# Patient Record
Sex: FEMALE | Race: WHITE | Employment: OTHER | URBAN - METROPOLITAN AREA
[De-identification: names, ages, dates, MRNs, and addresses within clinical notes are randomized per-mention and may not be internally consistent; named-entity substitution may affect disease eponyms.]

---

## 2022-04-19 PROBLEM — C49.A0 GASTROINTESTINAL STROMAL TUMOR (GIST) (HCC): Status: ACTIVE | Noted: 2022-04-19

## 2022-04-19 PROBLEM — D50.9 IRON DEFICIENCY ANEMIA: Status: ACTIVE | Noted: 2022-04-19

## 2022-04-19 PROBLEM — C78.7 SECONDARY MALIGNANT NEOPLASM OF LIVER AND INTRAHEPATIC BILE DUCT (HCC): Status: ACTIVE | Noted: 2022-04-19

## 2022-05-03 ENCOUNTER — HOSPITAL ENCOUNTER (OUTPATIENT)
Dept: LAB | Age: 72
Discharge: HOME | End: 2022-05-03

## 2022-05-03 DIAGNOSIS — C49.A0 GASTROINTESTINAL STROMAL TUMOR (GIST) (HCC): ICD-10-CM

## 2022-05-03 LAB
ALBUMIN SERPL-MCNC: 3.5 G/DL (ref 3.2–4.6)
ALBUMIN/GLOB SERPL: 1 {RATIO} (ref 1.2–3.5)
ALP SERPL-CCNC: 69 U/L (ref 50–136)
ALT SERPL-CCNC: 29 U/L (ref 12–65)
ANION GAP SERPL CALC-SCNC: 4 MMOL/L (ref 7–16)
AST SERPL-CCNC: 27 U/L (ref 15–37)
BASOPHILS # BLD: 0 K/UL (ref 0–0.2)
BASOPHILS NFR BLD: 1 % (ref 0–2)
BILIRUB SERPL-MCNC: 0.5 MG/DL (ref 0.2–1.1)
BUN SERPL-MCNC: 16 MG/DL (ref 8–23)
CALCIUM SERPL-MCNC: 8.9 MG/DL (ref 8.3–10.4)
CHLORIDE SERPL-SCNC: 107 MMOL/L (ref 98–107)
CO2 SERPL-SCNC: 30 MMOL/L (ref 21–32)
CREAT SERPL-MCNC: 0.6 MG/DL (ref 0.6–1)
DIFFERENTIAL METHOD BLD: ABNORMAL
EOSINOPHIL # BLD: 0.1 K/UL (ref 0–0.8)
EOSINOPHIL NFR BLD: 2 % (ref 0.5–7.8)
ERYTHROCYTE [DISTWIDTH] IN BLOOD BY AUTOMATED COUNT: 19.9 % (ref 11.9–14.6)
GLOBULIN SER CALC-MCNC: 3.5 G/DL (ref 2.3–3.5)
GLUCOSE SERPL-MCNC: 77 MG/DL (ref 65–100)
HCT VFR BLD AUTO: 41.7 % (ref 35.8–46.3)
HGB BLD-MCNC: 12.8 G/DL (ref 11.7–15.4)
IMM GRANULOCYTES # BLD AUTO: 0 K/UL (ref 0–0.5)
IMM GRANULOCYTES NFR BLD AUTO: 0 % (ref 0–5)
LYMPHOCYTES # BLD: 2.3 K/UL (ref 0.5–4.6)
LYMPHOCYTES NFR BLD: 31 % (ref 13–44)
MCH RBC QN AUTO: 25 PG (ref 26.1–32.9)
MCHC RBC AUTO-ENTMCNC: 30.7 G/DL (ref 31.4–35)
MCV RBC AUTO: 81.3 FL (ref 79.6–97.8)
MONOCYTES # BLD: 0.5 K/UL (ref 0.1–1.3)
MONOCYTES NFR BLD: 7 % (ref 4–12)
NEUTS SEG # BLD: 4.5 K/UL (ref 1.7–8.2)
NEUTS SEG NFR BLD: 59 % (ref 43–78)
NRBC # BLD: 0 K/UL (ref 0–0.2)
PLATELET # BLD AUTO: 237 K/UL (ref 150–450)
PMV BLD AUTO: 8.2 FL (ref 9.4–12.3)
POTASSIUM SERPL-SCNC: 3.6 MMOL/L (ref 3.5–5.1)
PROT SERPL-MCNC: 7 G/DL (ref 6.3–8.2)
RBC # BLD AUTO: 5.13 M/UL (ref 4.05–5.2)
SODIUM SERPL-SCNC: 141 MMOL/L (ref 136–145)
WBC # BLD AUTO: 7.5 K/UL (ref 4.3–11.1)

## 2022-05-03 PROCEDURE — 36415 COLL VENOUS BLD VENIPUNCTURE: CPT

## 2022-05-03 PROCEDURE — 85025 COMPLETE CBC W/AUTO DIFF WBC: CPT

## 2022-05-03 PROCEDURE — 80053 COMPREHEN METABOLIC PANEL: CPT

## 2022-05-23 ENCOUNTER — TELEPHONE (OUTPATIENT)
Dept: ONCOLOGY | Age: 72
End: 2022-05-23

## 2022-05-23 RX ORDER — REGORAFENIB 40 MG/1
120 TABLET, FILM COATED ORAL DAILY
Qty: 63 TABLET | Refills: 0 | Status: SHIPPED | OUTPATIENT
Start: 2022-05-23 | End: 2022-05-23

## 2022-05-23 RX ORDER — REGORAFENIB 40 MG/1
120 TABLET, FILM COATED ORAL DAILY
Qty: 63 TABLET | Refills: 2 | Status: SHIPPED | OUTPATIENT
Start: 2022-05-23 | End: 2022-05-23

## 2022-05-23 RX ORDER — REGORAFENIB 40 MG/1
120 TABLET, FILM COATED ORAL DAILY
Qty: 63 TABLET | Refills: 2 | Status: SHIPPED | OUTPATIENT
Start: 2022-05-23 | End: 2022-06-13

## 2022-05-23 NOTE — TELEPHONE ENCOUNTER
Sister called on behalf of pt requesting to speak to the nurse or Andrew Muñiz. Stating that [facility] has not relesed the steirvarga medication for the pt due to the directions not matching. The pt has been off the medication for a few days now and is wanting to speak to someone to have this fixed as soon as possible.

## 2022-05-23 NOTE — TELEPHONE ENCOUNTER
Stivarga prescription sent to [facility] (120 mg x 21 days with 7 days off). Returned call to pt to let her know [facility] will be calling her to set up shipment. Pt verbalized understanding and clarified that she indeed is supposed to take 3 tabs daily for a total of 120 mg x 21 days and not 1 pill (40 mg).

## 2022-05-23 NOTE — PROGRESS NOTES
Received message from [facility] wanting clarification stating: [facility] Team,  Vickie [phone]  ROSEMARY MAN hold, information to verify: Please review the dose of Stivarga 40mg, patients previous prescription was for 3 tablets (120mg) daily for 21 days on and 7 days off. The newest rx received from provider Mallory Cardenas is a significant dose decrease to one tablet daily. Please review. Thank you,  Katia Rice ([facility]). \"    I sent question to nurse for clarification.

## 2022-05-24 ENCOUNTER — TELEPHONE (OUTPATIENT)
Dept: ONCOLOGY | Age: 72
End: 2022-05-24

## 2022-05-26 NOTE — PROGRESS NOTES
Per Donovan Zepedama needed for [employer]. PA initiated and per CMM:       Enrollment application for Stivarga with [employer] submitted successfully today.

## 2022-05-26 NOTE — TELEPHONE ENCOUNTER
Received call from Pedro Luis Emerson in regards to patient's Stivarga. Representative stated Caden Gutierrez needs us to complete PA and finish the enrollment form via CMM. I expressed understanding and will follow up thru CMM to finish submitting PA and complete the enrollment as well.

## 2022-06-01 ENCOUNTER — TELEPHONE (OUTPATIENT)
Dept: ONCOLOGY | Age: 72
End: 2022-06-01

## 2022-06-01 NOTE — TELEPHONE ENCOUNTER
Sister called on behalf of pt requesting to r/s her appts on 6/6. Stated would like the appt scheduled 2 weeks out around June 20th-22th.

## 2022-06-02 ENCOUNTER — TELEPHONE (OUTPATIENT)
Dept: ONCOLOGY | Age: 72
End: 2022-06-02

## 2022-06-02 NOTE — TELEPHONE ENCOUNTER
Called pt to remind of appt on 06/06. Pt had to reschedule CT scan and will need to reschedule this follow up appt. Please contact pt with new appt info. CT scan is Fri 06/17.

## 2022-06-03 DIAGNOSIS — C49.A0 GASTROINTESTINAL STROMAL TUMOR (GIST) (HCC): Primary | ICD-10-CM

## 2022-06-03 NOTE — PROGRESS NOTES
Received fax from [facility] about [address] stating this drug is available thru patient's plan without a PA. Letter scanned into media.

## 2022-06-06 ENCOUNTER — HOSPITAL ENCOUNTER (OUTPATIENT)
Dept: LAB | Age: 72
Discharge: HOME | End: 2022-06-09
Payer: COMMERCIAL

## 2022-06-06 ENCOUNTER — OFFICE VISIT (OUTPATIENT)
Dept: ONCOLOGY | Age: 72
End: 2022-06-06
Payer: COMMERCIAL

## 2022-06-06 VITALS
WEIGHT: 119 LBS | BODY MASS INDEX: 21.9 KG/M2 | DIASTOLIC BLOOD PRESSURE: 91 MMHG | HEIGHT: 62 IN | RESPIRATION RATE: 17 BRPM | TEMPERATURE: 97.9 F | SYSTOLIC BLOOD PRESSURE: 189 MMHG | HEART RATE: 76 BPM | OXYGEN SATURATION: 99 %

## 2022-06-06 DIAGNOSIS — C49.A0 GASTROINTESTINAL STROMAL TUMOR (GIST) (HCC): ICD-10-CM

## 2022-06-06 DIAGNOSIS — Z79.899 HIGH RISK MEDICATION USE: ICD-10-CM

## 2022-06-06 DIAGNOSIS — L27.1 HAND FOOT SYNDROME: ICD-10-CM

## 2022-06-06 DIAGNOSIS — C49.A0 GASTROINTESTINAL STROMAL TUMOR (GIST) (HCC): Primary | ICD-10-CM

## 2022-06-06 DIAGNOSIS — C78.7 LIVER METASTASIS (HCC): ICD-10-CM

## 2022-06-06 DIAGNOSIS — I10 UNCONTROLLED HYPERTENSION: ICD-10-CM

## 2022-06-06 LAB
ALBUMIN SERPL-MCNC: 3.4 G/DL (ref 3.2–4.6)
ALBUMIN/GLOB SERPL: 0.9 {RATIO} (ref 1.2–3.5)
ALP SERPL-CCNC: 63 U/L (ref 50–136)
ALT SERPL-CCNC: 24 U/L (ref 12–65)
ANION GAP SERPL CALC-SCNC: 4 MMOL/L (ref 7–16)
AST SERPL-CCNC: 26 U/L (ref 15–37)
BASOPHILS # BLD: 0.1 K/UL (ref 0–0.2)
BASOPHILS NFR BLD: 1 % (ref 0–2)
BILIRUB SERPL-MCNC: 0.5 MG/DL (ref 0.2–1.1)
BUN SERPL-MCNC: 15 MG/DL (ref 8–23)
CALCIUM SERPL-MCNC: 8.6 MG/DL (ref 8.3–10.4)
CHLORIDE SERPL-SCNC: 105 MMOL/L (ref 98–107)
CO2 SERPL-SCNC: 29 MMOL/L (ref 21–32)
CREAT SERPL-MCNC: 0.6 MG/DL (ref 0.6–1)
DIFFERENTIAL METHOD BLD: ABNORMAL
EOSINOPHIL # BLD: 0.2 K/UL (ref 0–0.8)
EOSINOPHIL NFR BLD: 2 % (ref 0.5–7.8)
ERYTHROCYTE [DISTWIDTH] IN BLOOD BY AUTOMATED COUNT: 20.9 % (ref 11.9–14.6)
GLOBULIN SER CALC-MCNC: 3.8 G/DL (ref 2.3–3.5)
GLUCOSE SERPL-MCNC: 96 MG/DL (ref 65–100)
HCT VFR BLD AUTO: 40.5 % (ref 35.8–46.3)
HGB BLD-MCNC: 12.6 G/DL (ref 11.7–15.4)
IMM GRANULOCYTES # BLD AUTO: 0 K/UL (ref 0–0.5)
IMM GRANULOCYTES NFR BLD AUTO: 0 % (ref 0–5)
LYMPHOCYTES # BLD: 2.3 K/UL (ref 0.5–4.6)
LYMPHOCYTES NFR BLD: 27 % (ref 13–44)
MCH RBC QN AUTO: 25.1 PG (ref 26.1–32.9)
MCHC RBC AUTO-ENTMCNC: 31.1 G/DL (ref 31.4–35)
MCV RBC AUTO: 80.7 FL (ref 79.6–97.8)
MONOCYTES # BLD: 0.6 K/UL (ref 0.1–1.3)
MONOCYTES NFR BLD: 7 % (ref 4–12)
NEUTS SEG # BLD: 5.3 K/UL (ref 1.7–8.2)
NEUTS SEG NFR BLD: 63 % (ref 43–78)
NRBC # BLD: 0 K/UL (ref 0–0.2)
PLATELET # BLD AUTO: 294 K/UL (ref 150–450)
PMV BLD AUTO: 8.2 FL (ref 9.4–12.3)
POTASSIUM SERPL-SCNC: 3.7 MMOL/L (ref 3.5–5.1)
PROT SERPL-MCNC: 7.2 G/DL (ref 6.3–8.2)
RBC # BLD AUTO: 5.02 M/UL (ref 4.05–5.2)
SODIUM SERPL-SCNC: 138 MMOL/L (ref 136–145)
WBC # BLD AUTO: 8.5 K/UL (ref 4.3–11.1)

## 2022-06-06 PROCEDURE — 80053 COMPREHEN METABOLIC PANEL: CPT

## 2022-06-06 PROCEDURE — 36415 COLL VENOUS BLD VENIPUNCTURE: CPT

## 2022-06-06 PROCEDURE — 85025 COMPLETE CBC W/AUTO DIFF WBC: CPT

## 2022-06-06 PROCEDURE — 99215 OFFICE O/P EST HI 40 MIN: CPT | Performed by: INTERNAL MEDICINE

## 2022-06-06 PROCEDURE — 1123F ACP DISCUSS/DSCN MKR DOCD: CPT | Performed by: INTERNAL MEDICINE

## 2022-06-06 RX ORDER — METOPROLOL SUCCINATE 25 MG/1
25 TABLET, EXTENDED RELEASE ORAL DAILY
COMMUNITY
Start: 2022-04-19 | End: 2022-07-06 | Stop reason: SDUPTHER

## 2022-06-06 ASSESSMENT — PATIENT HEALTH QUESTIONNAIRE - PHQ9
1. LITTLE INTEREST OR PLEASURE IN DOING THINGS: 1
2. FEELING DOWN, DEPRESSED OR HOPELESS: 1
SUM OF ALL RESPONSES TO PHQ QUESTIONS 1-9: 2
SUM OF ALL RESPONSES TO PHQ9 QUESTIONS 1 & 2: 2
SUM OF ALL RESPONSES TO PHQ QUESTIONS 1-9: 2

## 2022-06-06 NOTE — PROGRESS NOTES
[facility]: Office Visit Progress Note    Chief Complaint:     GIST      History of Present Illness:   Reason for Referral: Gastrointestinal stromal  tumor (GIST); Secondary malignant neoplasm of liver and intrahepatic bile duct; Iron deficiency anemia due to chronic blood loss       Referring Provider: Daniel Lopes NP       Primary Care Provider: Pa Rivera NP       Family History of Cancer/Hematologic Disorders: Family history is significant for lung cancer, throat/neck cancer, and acute leukemia.        Presenting Symptoms: Fatigue, constipation, nausea, and abdominal distension       Ms. Ross is a 80 y.o. white female with a history of anemia with history of blood transfusion, HTN, fibroid removal, and myomectomy. Patient  was admitted at [address] in March of 2020 for severe anemia and GI bleed. Work-up included CT scan of the abdomen and pelvis on 3/16/2020 which identified a large soft tissue mass in the posterior margin of the greater curvature measuring  17.9 cm with hypodensity in the left hepatic lobe reflective of metastatic lesion and soft tissue lesion adjacent to the right posterior hepatic lobe suggestive of metastatic implant. On 3/18/20 she underwent upper GI endoscopy with biopsy of the gastric  body mass with pathology revealing a gastrointestinal stromal tumor. Immunohistochemistry was strongly positive for  confirming GIST. Flow cytometric analysis showed mild granulocytic left shift and mild monocytosis; and elevated T cell CD4/CD8 ratio,  with no definitive immunophenotypic evidence of lymphoproliferative disease. During hospitalization, CEA was 0.8 and HGB dropped to 6.0. She received several units of PRBCs. She also received IV iron infusions, as oral iron had been ineffective.  She was  planned for a colonoscopy; however, she declined the procedure.        Upon discharge, she was seen outpatient by Oncologist, Dr. Mike Philip, at [facility]. She started treatment for GIST with Imatinib on 5/24/2020.  CT of the abdomen and pelvis on 9/21/20 when compared to CT of 3/16/20 showed  a significant initial response to treatment with the LUQ tumor measuring 15.5 x 12.6 x 9.9 cm in March of 2020 and measuring 10.3 x 9.9. 3.5 cm in September of 2020. CT of the abdomen and pelvis on 2/17/21 and 8/27/21 showed a stable appearance of gastric  neoplasm with no progression of disease. Patient continued to tolerate Imatinib well.        On 11/8/21, CT A/P imaging showed signs of disease progression. In view of progression, treatment was changed to Sunitinib with does reduction to 37.5 mg daily, 4 weeks on, 2 weeks off. ECHO on 12/9/21 showed an EF of 60% and patient was subsequently  started on Sutent. Unfortunately, she experienced severe body aches, peeling skin and felt miserable, and Sutent was discontinued.  Patient was given information about Stivarga, and she initially declined to start this. However, on 2/21/22, CT of the  abdomen and pelvis showed multiple large partially cystic masses throughout the abdomen and pelvis which had significantly progressed compared to imaging on 11/8/21. Patient was started on Stivarga 40 mg on 3/10/22.         Labs drawn on 2/23/22 showed mild worsening of anemia. Patient reported intermittent bloody stools, but she declined GI referral. Iron panel showed iron deficiency and IV Infed x 4 doses was ordered. Patient received the first dose on 3/30/22. No documentation  was sent to indicate that she received the remaining 3 doses.        Most recent labs drawn on 3/31/22 were significant for HGB 11.4, HCT 34.5, RDW 18.6, , and monocytes 10.1. Patient was most recently seen by Dr. Cyn Monahan on 3/30/22.  Patient has since relocated from [place] to the [facility], and now presents  to [facility], per referral from primary care, to establish oncology care for continued management of gastrointestinal stromal tumor, secondary malignant neoplasm of liver and intrahepatic  bile duct, and iron deficiency anemia due to chronic blood loss. She continues on Stivarga 40 mg.        SURGICAL PATHOLOGY 3/18/2020                 FLOW CYTOMETRY ANALYSIS 3/18/2020            CT ABDOMEN AND PELVIS W CONTRAST 11/8/21                               CT CHEST WITH CONTRAST 11/8/21                 CT OF THE ABDOMEN AND PELVIS WITHOUT IV CONTRAST 2/21/22                      CMP 1/6/22            CBC 3/9/22                 CBC 3/31/22                    Review of Systems:      Constitutional  Denies fever or chills.  Denies weight loss or appetite changes. Denies anorexia. Meilie Duty  Denies trauma, hearing loss, ear pain, nosebleeds, sore throat, neck pain and ear discharge.    Change in vision         Skin  Rash, itching, blister     Lungs  Denies shortness of breath, cough, sputum production or hemoptysis. Cardiovascular  Denies palpitations, orthopnea, claudication and leg swelling. Chest pain/pressure     Gastrointestinal  Denies nausea, vomiting. Denies abdominal pain. Constipation, dark black/bloody stools       Denies dysuria, or hesitancy of urination   Frequency      Neuro  Denies ataxia. Denies dizziness, tremors, speech change, focal weakness. Neuropathy, headaches     Hematology  Denies nasal/gum bleeding, denies easy bruise     Endo  Denies heat/cold intolerance, denies diabetes. MSK  Denies back pain, swollen legs, and falls. Joint pain     Psychiatric/Behavioral  Denies depression and substance abuse.  The patient is not nervous/anxious.              No Known Allergies  Past Medical History:   Diagnosis Date    Anemia     GIST (gastrointestinal stromal tumor), malignant ([address]     Hypertension     Stomach cancer ([address]     GIST     Past Surgical History:   Procedure Laterality Date    MYOMECTOMY      OTHER SURGICAL HISTORY      removal of fibroid    UPPER GASTROINTESTINAL ENDOSCOPY      UPPER GASTROINTESTINAL ENDOSCOPY       Family History   Problem Relation Age of Onset    Lung Cancer Mother     Diabetes Mother     No Known Problems Brother      Social History     Socioeconomic History    Marital status: Single     Spouse name: Not on file    Number of children: Not on file    Years of education: Not on file    Highest education level: Not on file   Occupational History    Not on file   Tobacco Use    Smoking status: Never Smoker    Smokeless tobacco: Never Used   Substance and Sexual Activity    Alcohol use: Not Currently    Drug use: Not Currently    Sexual activity: Not on file   Other Topics Concern    Not on file   Social History Narrative    Not on file     Social Determinants of Health     Financial Resource Strain:     Difficulty of Paying Living Expenses: Not on file   Food Insecurity:     Worried About Running Out of Food in the Last Year: Not on file    Min of Food in the Last Year: Not on file   Transportation Needs:     Lack of Transportation (Medical): Not on file    Lack of Transportation (Non-Medical):  Not on file   Physical Activity:     Days of Exercise per Week: Not on file    Minutes of Exercise per Session: Not on file   Stress:     Feeling of Stress : Not on file   Social Connections:     Frequency of Communication with Friends and Family: Not on file    Frequency of Social Gatherings with Friends and Family: Not on file    Attends [employer] Services: Not on file    Active Member of Clubs or Organizations: Not on file    Attends Club or Organization Meetings: Not on file    Marital Status: Not on file   Intimate Partner Violence:     Fear of Current or Ex-Partner: Not on file    Emotionally Abused: Not on file    Physically Abused: Not on file    Sexually Abused: Not on file   Housing Stability:     Unable to Pay for Housing in the Last Year: Not on file    Number of [place] in the Last Year: Not on file    Unstable Housing in the Last Year: Not on file     Current Outpatient Medications   Medication Sig Dispense Refill    metoprolol succinate (TOPROL XL) 25 MG extended release tablet Take 25 mg by mouth daily      urea (CARMOL) 10 % cream Apply topically 2 times daily Apply topically as needed. 453 g 0    regorafenib (STIVARGA) 40 MG tablet Take 3 tablets by mouth daily for 21 days Take 3 tabs (120 mg) by mouth daily x 21 days and 7 days off 63 tablet 2     No current facility-administered medications for this visit. OBJECTIVE:  BP (!) 189/91 (Site: Right Upper Arm, Position: Standing, Cuff Size: Medium Adult)   Pulse 76   Temp 97.9 °F (36.6 °C) (Oral)   Resp 17   Ht 5' 2\" (1.575 m)   Wt 119 lb (54 kg)   SpO2 99%   Breastfeeding No   BMI 21.77 kg/m²     Physical Exam:  Constitutional: Oriented to person, place, and time. Well-developed and well-nourished. HEENT: Normocephalic and atraumatic. Oropharynx is clear and moist.   Conjunctivae and EOM are normal. Pupils are equal, round, and reactive to light. No scleral icterus. Neck supple. No JVD present. No tracheal deviation present. No thyromegaly present. Lymph node No palpable submandibular, cervical, supraclavicular, axillary and inguinal lymph nodes. Skin  calluses and blisters on bottom of both feet. Warm and dry. No bruising and no rash noted. No erythema. No pallor. Respiratory Effort normal and breath sounds normal.  No respiratory distress. No wheezes. No rales. No tenderness. CVS Normal rate, regular rhythm and normal heart sounds. Exam reveals no gallop, no friction and no rub. No murmur heard. Abdomen Soft. Bowel sounds are normal. Exhibits no distension. There is no tenderness. There is no rebound and no guarding. Neuro Normal reflexes. No cranial nerve deficit. Exhibits normal muscle tone, 5 of 5 strength of all extremities. MSK Normal range of motion in general.  No edema and no tenderness. Psych Normal mood, affect, behavior, judgment and thought content      Labs:  Recent Results (from the past 24 hour(s))   CBC with Auto Differential    Collection Time: 06/06/22  2:49 PM   Result Value Ref Range    WBC 8.5 4.3 - 11.1 K/uL    RBC 5.02 4.05 - 5.2 M/uL    Hemoglobin 12.6 11.7 - 15.4 g/dL    Hematocrit 40.5 35.8 - 46.3 %    MCV 80.7 79.6 - 97.8 FL    MCH 25.1 (L) 26.1 - 32.9 PG    MCHC 31.1 (L) 31.4 - 35.0 g/dL    RDW 20.9 (H) 11.9 - 14.6 %    Platelets 879 388 - 439 K/uL    MPV 8.2 (L) 9.4 - 12.3 FL    nRBC 0.00 0.0 - 0.2 K/uL    Seg Neutrophils 63 43 - 78 %    Lymphocytes 27 13 - 44 %    Monocytes 7 4.0 - 12.0 %    Eosinophils % 2 0.5 - 7.8 %    Basophils 1 0.0 - 2.0 %    Immature Granulocytes 0 0.0 - 5.0 %    Segs Absolute 5.3 1.7 - 8.2 K/UL    Absolute Lymph # 2.3 0.5 - 4.6 K/UL    Absolute Mono # 0.6 0.1 - 1.3 K/UL    Absolute Eos # 0.2 0.0 - 0.8 K/UL    Basophils Absolute 0.1 0.0 - 0.2 K/UL    Absolute Immature Granulocyte 0.0 0.0 - 0.5 K/UL    Differential Type AUTOMATED     Comprehensive Metabolic Panel    Collection Time: 06/06/22  2:49 PM   Result Value Ref Range    Sodium 138 136 - 145 mmol/L    Potassium 3.7 3.5 - 5.1 mmol/L    Chloride 105 98 - 107 mmol/L    CO2 29 21 - 32 mmol/L    Anion Gap 4 (L) 7 - 16 mmol/L    Glucose 96 65 - 100 mg/dL    BUN 15 8 - 23 MG/DL    CREATININE 0.60 0.6 - 1.0 MG/DL    GFR African American >60 >60 ml/min/1.73m2    GFR Non- >60 >60 ml/min/1.73m2    Calcium 8.6 8.3 - 10.4 MG/DL    Total Bilirubin 0.5 0.2 - 1.1 MG/DL    ALT 24 12 - 65 U/L    AST 26 15 - 37 U/L    Alk Phosphatase 63 50 - 136 U/L    Total Protein 7.2 6.3 - 8.2 g/dL    Albumin 3.4 3.2 - 4.6 g/dL    Globulin 3.8 (H) 2.3 - 3.5 g/dL    Albumin/Globulin Ratio 0.9 (L) 1.2 - 3.5         Imaging:  No results found for this or any previous visit. ASSESSMENT/PLAN:   Diagnosis Orders   1. Gastrointestinal stromal tumor (GIST) ([facility]     2. Liver metastasis ([facility]     3.  Hand foot syndrome 4. Uncontrolled hypertension     5. High risk medication use       80 y.o. F consulted for metastatic GIST presented to [facility] with sister on 5/3/2022. She was diagnosed of large stomach GIST  and the liver metastasis in 3/2020, started [address] in 5/2020 with good response until 11/2021, did not try to increase dose of imatinib and changed to sunitinib which was poorly tolerated and stopped quickly, follow-up imaging showed disease progression  and started regorafenib from 3/2022, relocated to [place] to join sister to help with her care given she struggles to manage her medication and visits due to the Alzheimer's. We discussed her current dose of Regorafenib was dose reduced but will  continue current 120 mg dose and repeat CT in a month, will obtain image from [place] to compare, may consider rechallenge with higher dose of imatinib 800 mg daily given the good tolerance and efficacy previously, may repeat biopsy for mutation screening,  she also had complaining of headache but declined brain MRI. Return next month but call as needed. She rescheduled that the CT and did not return before starting cycle 4, called to working on 6/6/2022 complaining of pain and blister on bottom of feet, which did not seem typical spread of HFS but there is calluses with blisters and pain, which was consistent to her poor foot care when she forgot to bring her shoes from [place] to her brother sent them over, prescribed urea lotion and discussed saturate with lotion and wear socks and comfortable shoes, would not change dose for Stivarga for now, concern of increased abdominal distention and pain but had not color response for imaging after faxing and calling/leaving messages to her previous oncologist, repeat CT and return in 2 weeks, call as needed. All questions are answered to their satisfaction. They will call for further questions and concerns.       ECOG PERFORMANCE STATUS - 1- Restricted in physically strenuous activity but ambulatory and able to carry out work of a light or sedentary nature such as light house work, office work. Pain - 0 - No pain/10. Mild to moderate pain, requiring medication - see MAR     Fatigue - No flowsheet data found. Distress - No flowsheet data found. Elements of this note have been dictated via voice recognition software. Text and phrases may be limited by the accuracy and autoconversion of the software. The chart has been reviewed, but errors may still be present. Jarad Freitas M.D.   [address]  Office : [phone]  Fax : [phone]

## 2022-06-06 NOTE — PATIENT INSTRUCTIONS
Patient Instructions from Today's Visit    Reason for Visit:  Follow-up visit - GIST    Diagnosis Information:  [email or web address]-[email or web address]-[email or web address]-[email or web address]-[email or web address]-sheets      Plan:  -CT scheduled in a couple weeks. Continue taking the Stivarga. -You should start checking your blood pressure at home in the morning and at night while at rest. The goal for the top number of your BP is 110-140. Keep a log and bring it to us or to your primary care doctor so he/she can adjust your medications accordingly.      -Hand-foot syndrome  · Caused by some chemotherapy (IV and pill)  · Palms of hands and bottoms of feet become red, tender and possible peeling/blistering of skin. Looks like a sunburn. · Heat and friction to hands and feet can increase symptoms  · Prevention measures  · reduce friction and heat exposure to hands and feet  · avoid exposure to hot water (washing dishes, long showers, baths, etc.)  · avoid jogging, aerobics, jumping, and long days of walking  · cold may provide relief from pain - ice packs or frozen bag of veggies to hands and feet. Alternate on and off for about 15 minutes at a time. · keep hands and feet very well moisturized, several times a day. Good lotions      Aveeno, lubriderm, udder cream, bag balm, etc.  Lotion with urea in it. · You need to call when any symptoms start - redness, tenderness, or peeling.                    Follow Up:  -After CT scan     Recent Lab Results:  Hospital Outpatient Visit on 06/06/2022   Component Date Value Ref Range Status    WBC 06/06/2022 8.5  4.3 - 11.1 K/uL Final    RBC 06/06/2022 5.02  4.05 - 5.2 M/uL Final    Hemoglobin 06/06/2022 12.6  11.7 - 15.4 g/dL Final    Hematocrit 06/06/2022 40.5  35.8 - 46.3 % Final    MCV 06/06/2022 80.7  79.6 - 97.8 FL Final    MCH 06/06/2022 25.1* 26.1 - 32.9 PG Final    MCHC 06/06/2022 31.1* 31.4 - 35.0 g/dL Final    RDW 06/06/2022 20.9* 11.9 - 14.6 % Final    Platelets 19/53/4901 294  150 - 450 K/uL Final    MPV 06/06/2022 8.2* 9.4 - 12.3 FL Final    nRBC 06/06/2022 0.00  0.0 - 0.2 K/uL Final    **Note: Absolute NRBC parameter is now reported with Hemogram**    Seg Neutrophils 06/06/2022 63  43 - 78 % Final    Lymphocytes 06/06/2022 27  13 - 44 % Final    Monocytes 06/06/2022 7  4.0 - 12.0 % Final    Eosinophils % 06/06/2022 2  0.5 - 7.8 % Final    Basophils 06/06/2022 1  0.0 - 2.0 % Final    Immature Granulocytes 06/06/2022 0  0.0 - 5.0 % Final    Segs Absolute 06/06/2022 5.3  1.7 - 8.2 K/UL Final    Absolute Lymph # 06/06/2022 2.3  0.5 - 4.6 K/UL Final    Absolute Mono # 06/06/2022 0.6  0.1 - 1.3 K/UL Final    Absolute Eos # 06/06/2022 0.2  0.0 - 0.8 K/UL Final    Basophils Absolute 06/06/2022 0.1  0.0 - 0.2 K/UL Final    Absolute Immature Granulocyte 06/06/2022 0.0  0.0 - 0.5 K/UL Final    Differential Type 06/06/2022 AUTOMATED    Final    Sodium 06/06/2022 138  136 - 145 mmol/L Final    Potassium 06/06/2022 3.7  3.5 - 5.1 mmol/L Final    Chloride 06/06/2022 105  98 - 107 mmol/L Final    CO2 06/06/2022 29  21 - 32 mmol/L Final    Anion Gap 06/06/2022 4* 7 - 16 mmol/L Final    Glucose 06/06/2022 96  65 - 100 mg/dL Final    BUN 06/06/2022 15  8 - 23 MG/DL Final    CREATININE 06/06/2022 0.60  0.6 - 1.0 MG/DL Final    GFR  06/06/2022 >60  >60 ml/min/1.73m2 Final    GFR Non- 06/06/2022 >60  >60 ml/min/1.73m2 Final    Comment:      Estimated GFR is calculated using the Modification of Diet in Renal Disease (MDRD) Study equation, reported for both  Americans (GFRAA) and non- Americans (GFRNA), and normalized to 1.73m2 body surface area. The physician must decide which value applies to the patient. The MDRD study equation should only be used in individuals age 25 or older.  It has not been validated for the following: pregnant women, patients with serious comorbid conditions,or on certain medications, or persons with extremes of body size, muscle mass, or nutritional status.  Calcium 06/06/2022 8.6  8.3 - 10.4 MG/DL Final    Total Bilirubin 06/06/2022 0.5  0.2 - 1.1 MG/DL Final    ALT 06/06/2022 24  12 - 65 U/L Final    AST 06/06/2022 26  15 - 37 U/L Final    Alk Phosphatase 06/06/2022 63  50 - 136 U/L Final    Total Protein 06/06/2022 7.2  6.3 - 8.2 g/dL Final    Albumin 06/06/2022 3.4  3.2 - 4.6 g/dL Final    Globulin 06/06/2022 3.8* 2.3 - 3.5 g/dL Final    Albumin/Globulin Ratio 06/06/2022 0.9* 1.2 - 3.5   Final       Treatment Summary has been discussed and given to patient: N/A      -------------------------------------------------------------------------------------------------------------------  Please call our office at [phone] if you have any  of the following symptoms:   · Fever of 100.5 or greater  · Chills  · Shortness of breath  · Swelling or pain in one leg    After office hours an answering service is available and will contact a provider for emergencies or if you are experiencing any of the above symptoms.  Patient did not express an interest in My Chart. My Chart log in information explained on the after visit summary printout at the [facility] desk.     Michael Zaragoza RN

## 2022-06-13 ENCOUNTER — TELEPHONE (OUTPATIENT)
Dept: ONCOLOGY | Age: 72
End: 2022-06-13

## 2022-06-13 NOTE — TELEPHONE ENCOUNTER
Pt called requesting to speak to the nurse. .. Stated she had an appt on Monday and was under the impression the Dr told her to stop her chemo (Stivarga 40mg) due to her having some blisters on her foot and the impression of her having hand and food disease. Would like to know if she can start taking them again.

## 2022-06-13 NOTE — TELEPHONE ENCOUNTER
Call to [address] [place] [address] had stopped the stivarga from the 6 th. I spoke to her and she states her feet are better and she will start the [address] [place] [address] Street today.  She Verbalizes understanding of instructions

## 2022-06-15 DIAGNOSIS — C49.A0 GASTROINTESTINAL STROMAL TUMOR (GIST) (HCC): Primary | ICD-10-CM

## 2022-06-17 ENCOUNTER — HOSPITAL ENCOUNTER (OUTPATIENT)
Dept: CT IMAGING | Age: 72
Discharge: HOME | End: 2022-06-20

## 2022-06-17 DIAGNOSIS — C78.7 SECONDARY MALIGNANT NEOPLASM OF LIVER (HCC): ICD-10-CM

## 2022-06-17 DIAGNOSIS — C49.A0 GASTROINTESTINAL STROMAL TUMOR (HCC): ICD-10-CM

## 2022-06-20 ENCOUNTER — OFFICE VISIT (OUTPATIENT)
Dept: ONCOLOGY | Age: 72
End: 2022-06-20
Payer: MEDICARE

## 2022-06-20 ENCOUNTER — HOSPITAL ENCOUNTER (OUTPATIENT)
Dept: LAB | Age: 72
Discharge: HOME | End: 2022-06-23
Payer: MEDICARE

## 2022-06-20 VITALS
TEMPERATURE: 97.8 F | RESPIRATION RATE: 16 BRPM | HEART RATE: 75 BPM | DIASTOLIC BLOOD PRESSURE: 91 MMHG | SYSTOLIC BLOOD PRESSURE: 185 MMHG | BODY MASS INDEX: 21.23 KG/M2 | WEIGHT: 115.4 LBS | HEIGHT: 62 IN | OXYGEN SATURATION: 99 %

## 2022-06-20 DIAGNOSIS — C49.A0 GASTROINTESTINAL STROMAL TUMOR (GIST) (HCC): ICD-10-CM

## 2022-06-20 DIAGNOSIS — Z79.899 HIGH RISK MEDICATION USE: ICD-10-CM

## 2022-06-20 DIAGNOSIS — C78.7 LIVER METASTASIS (HCC): ICD-10-CM

## 2022-06-20 DIAGNOSIS — I10 UNCONTROLLED HYPERTENSION: ICD-10-CM

## 2022-06-20 DIAGNOSIS — R63.4 WEIGHT LOSS: ICD-10-CM

## 2022-06-20 DIAGNOSIS — C49.A0 GASTROINTESTINAL STROMAL TUMOR (GIST) (HCC): Primary | ICD-10-CM

## 2022-06-20 DIAGNOSIS — L27.1 HAND FOOT SYNDROME: ICD-10-CM

## 2022-06-20 LAB
ALBUMIN SERPL-MCNC: 3.3 G/DL (ref 3.2–4.6)
ALBUMIN/GLOB SERPL: 0.8 {RATIO} (ref 1.2–3.5)
ALP SERPL-CCNC: 63 U/L (ref 50–136)
ALT SERPL-CCNC: 17 U/L (ref 12–65)
ANION GAP SERPL CALC-SCNC: 5 MMOL/L (ref 7–16)
AST SERPL-CCNC: 21 U/L (ref 15–37)
BASOPHILS # BLD: 0 K/UL (ref 0–0.2)
BASOPHILS NFR BLD: 1 % (ref 0–2)
BILIRUB SERPL-MCNC: 0.6 MG/DL (ref 0.2–1.1)
BUN SERPL-MCNC: 17 MG/DL (ref 8–23)
CALCIUM SERPL-MCNC: 8.7 MG/DL (ref 8.3–10.4)
CHLORIDE SERPL-SCNC: 105 MMOL/L (ref 98–107)
CO2 SERPL-SCNC: 29 MMOL/L (ref 21–32)
CREAT SERPL-MCNC: 0.7 MG/DL (ref 0.6–1)
DIFFERENTIAL METHOD BLD: ABNORMAL
EOSINOPHIL # BLD: 0.2 K/UL (ref 0–0.8)
EOSINOPHIL NFR BLD: 2 % (ref 0.5–7.8)
ERYTHROCYTE [DISTWIDTH] IN BLOOD BY AUTOMATED COUNT: 21.2 % (ref 11.9–14.6)
GLOBULIN SER CALC-MCNC: 3.9 G/DL (ref 2.3–3.5)
GLUCOSE SERPL-MCNC: 122 MG/DL (ref 65–100)
HCT VFR BLD AUTO: 42.2 % (ref 35.8–46.3)
HGB BLD-MCNC: 13 G/DL (ref 11.7–15.4)
IMM GRANULOCYTES # BLD AUTO: 0 K/UL (ref 0–0.5)
IMM GRANULOCYTES NFR BLD AUTO: 0 % (ref 0–5)
LYMPHOCYTES # BLD: 1.7 K/UL (ref 0.5–4.6)
LYMPHOCYTES NFR BLD: 23 % (ref 13–44)
MAGNESIUM SERPL-MCNC: 2.1 MG/DL (ref 1.8–2.4)
MCH RBC QN AUTO: 25 PG (ref 26.1–32.9)
MCHC RBC AUTO-ENTMCNC: 30.8 G/DL (ref 31.4–35)
MCV RBC AUTO: 81.2 FL (ref 79.6–97.8)
MONOCYTES # BLD: 0.5 K/UL (ref 0.1–1.3)
MONOCYTES NFR BLD: 7 % (ref 4–12)
NEUTS SEG # BLD: 4.8 K/UL (ref 1.7–8.2)
NEUTS SEG NFR BLD: 67 % (ref 43–78)
NRBC # BLD: 0 K/UL (ref 0–0.2)
PLATELET # BLD AUTO: 353 K/UL (ref 150–450)
PMV BLD AUTO: 8.5 FL (ref 9.4–12.3)
POTASSIUM SERPL-SCNC: 3.8 MMOL/L (ref 3.5–5.1)
PROT SERPL-MCNC: 7.2 G/DL (ref 6.3–8.2)
RBC # BLD AUTO: 5.2 M/UL (ref 4.05–5.2)
SODIUM SERPL-SCNC: 139 MMOL/L (ref 136–145)
WBC # BLD AUTO: 7.1 K/UL (ref 4.3–11.1)

## 2022-06-20 PROCEDURE — 1090F PRES/ABSN URINE INCON ASSESS: CPT | Performed by: INTERNAL MEDICINE

## 2022-06-20 PROCEDURE — G8427 DOCREV CUR MEDS BY ELIG CLIN: HCPCS | Performed by: INTERNAL MEDICINE

## 2022-06-20 PROCEDURE — 3017F COLORECTAL CA SCREEN DOC REV: CPT | Performed by: INTERNAL MEDICINE

## 2022-06-20 PROCEDURE — 85025 COMPLETE CBC W/AUTO DIFF WBC: CPT

## 2022-06-20 PROCEDURE — 1123F ACP DISCUSS/DSCN MKR DOCD: CPT | Performed by: INTERNAL MEDICINE

## 2022-06-20 PROCEDURE — 83735 ASSAY OF MAGNESIUM: CPT

## 2022-06-20 PROCEDURE — 36415 COLL VENOUS BLD VENIPUNCTURE: CPT

## 2022-06-20 PROCEDURE — 1036F TOBACCO NON-USER: CPT | Performed by: INTERNAL MEDICINE

## 2022-06-20 PROCEDURE — G8420 CALC BMI NORM PARAMETERS: HCPCS | Performed by: INTERNAL MEDICINE

## 2022-06-20 PROCEDURE — G8400 PT W/DXA NO RESULTS DOC: HCPCS | Performed by: INTERNAL MEDICINE

## 2022-06-20 PROCEDURE — 99215 OFFICE O/P EST HI 40 MIN: CPT | Performed by: INTERNAL MEDICINE

## 2022-06-20 PROCEDURE — 80053 COMPREHEN METABOLIC PANEL: CPT

## 2022-06-20 RX ORDER — LOSARTAN POTASSIUM 25 MG/1
25 TABLET ORAL DAILY
Qty: 90 TABLET | Refills: 0 | Status: SHIPPED | OUTPATIENT
Start: 2022-06-20 | End: 2022-06-21 | Stop reason: DRUGHIGH

## 2022-06-20 RX ORDER — REGORAFENIB 40 MG/1
120 TABLET, FILM COATED ORAL DAILY
COMMUNITY
Start: 2022-06-14 | End: 2022-07-20 | Stop reason: SDUPTHER

## 2022-06-20 ASSESSMENT — PATIENT HEALTH QUESTIONNAIRE - PHQ9
SUM OF ALL RESPONSES TO PHQ QUESTIONS 1-9: 0
SUM OF ALL RESPONSES TO PHQ QUESTIONS 1-9: 0
2. FEELING DOWN, DEPRESSED OR HOPELESS: 0
SUM OF ALL RESPONSES TO PHQ QUESTIONS 1-9: 0
SUM OF ALL RESPONSES TO PHQ9 QUESTIONS 1 & 2: 0
1. LITTLE INTEREST OR PLEASURE IN DOING THINGS: 0
SUM OF ALL RESPONSES TO PHQ QUESTIONS 1-9: 0

## 2022-06-20 NOTE — PATIENT INSTRUCTIONS
Patient Instructions from Today's Visit    Reason for Visit:  Follow-up visit for GIST     Diagnosis Information:  [email or web address]    Plan:  -Continue taking the Stivarga.   -We will prescribe losartan to add to your blood pressure medication metoprolol.   -The CT scan did not show significant change. Continue with current treatment.   -You can take a vitamin B1 supplement daily (available over-the-counter).      Follow Up:  -1 month    Recent Lab Results:  Hospital Outpatient Visit on 06/20/2022   Component Date Value Ref Range Status    WBC 06/20/2022 7.1  4.3 - 11.1 K/uL Final    RBC 06/20/2022 5.20  4.05 - 5.2 M/uL Final    Hemoglobin 06/20/2022 13.0  11.7 - 15.4 g/dL Final    Hematocrit 06/20/2022 42.2  35.8 - 46.3 % Final    MCV 06/20/2022 81.2  79.6 - 97.8 FL Final    MCH 06/20/2022 25.0* 26.1 - 32.9 PG Final    MCHC 06/20/2022 30.8* 31.4 - 35.0 g/dL Final    RDW 06/20/2022 21.2* 11.9 - 14.6 % Final    Platelets 48/58/4986 353  150 - 450 K/uL Final    MPV 06/20/2022 8.5* 9.4 - 12.3 FL Final    nRBC 06/20/2022 0.00  0.0 - 0.2 K/uL Final    **Note: Absolute NRBC parameter is now reported with Hemogram**    Seg Neutrophils 06/20/2022 67  43 - 78 % Final    Lymphocytes 06/20/2022 23  13 - 44 % Final    Monocytes 06/20/2022 7  4.0 - 12.0 % Final    Eosinophils % 06/20/2022 2  0.5 - 7.8 % Final    Basophils 06/20/2022 1  0.0 - 2.0 % Final    Immature Granulocytes 06/20/2022 0  0.0 - 5.0 % Final    Segs Absolute 06/20/2022 4.8  1.7 - 8.2 K/UL Final    Absolute Lymph # 06/20/2022 1.7  0.5 - 4.6 K/UL Final    Absolute Mono # 06/20/2022 0.5  0.1 - 1.3 K/UL Final    Absolute Eos # 06/20/2022 0.2  0.0 - 0.8 K/UL Final    Basophils Absolute 06/20/2022 0.0  0.0 - 0.2 K/UL Final    Absolute Immature Granulocyte 06/20/2022 0.0  0.0 - 0.5 K/UL Final    Differential Type 06/20/2022 AUTOMATED    Final    Sodium 06/20/2022 139  136 - 145 mmol/L Final    Potassium 06/20/2022 3.8  3.5 - 5.1 mmol/L Final    Chloride 06/20/2022 105  98 - 107 mmol/L Final    CO2 06/20/2022 29  21 - 32 mmol/L Final    Anion Gap 06/20/2022 5* 7 - 16 mmol/L Final    Glucose 06/20/2022 122* 65 - 100 mg/dL Final    BUN 06/20/2022 17  8 - 23 MG/DL Final    CREATININE 06/20/2022 0.70  0.6 - 1.0 MG/DL Final    GFR  06/20/2022 >60  >60 ml/min/1.73m2 Final    GFR Non- 06/20/2022 >60  >60 ml/min/1.73m2 Final    Comment:      Estimated GFR is calculated using the Modification of Diet in Renal Disease (MDRD) Study equation, reported for both  Americans (GFRAA) and non- Americans (GFRNA), and normalized to 1.73m2 body surface area. The physician must decide which value applies to the patient. The MDRD study equation should only be used in individuals age 25 or older. It has not been validated for the following: pregnant women, patients with serious comorbid conditions,or on certain medications, or persons with extremes of body size, muscle mass, or nutritional status.       Calcium 06/20/2022 8.7  8.3 - 10.4 MG/DL Final    Total Bilirubin 06/20/2022 0.6  0.2 - 1.1 MG/DL Final    ALT 06/20/2022 17  12 - 65 U/L Final    AST 06/20/2022 21  15 - 37 U/L Final    Alk Phosphatase 06/20/2022 63  50 - 136 U/L Final    Total Protein 06/20/2022 7.2  6.3 - 8.2 g/dL Final    Albumin 06/20/2022 3.3  3.2 - 4.6 g/dL Final    Globulin 06/20/2022 3.9* 2.3 - 3.5 g/dL Final    Albumin/Globulin Ratio 06/20/2022 0.8* 1.2 - 3.5   Final    Magnesium 06/20/2022 2.1  1.8 - 2.4 mg/dL Final       Treatment Summary has been discussed and given to patient: N/A      -------------------------------------------------------------------------------------------------------------------  Please call our office at [phone] if you have any  of the following symptoms:   · Fever of 100.5 or greater  · Chills  · Shortness of breath  · Swelling or pain in one leg    After office hours an answering service is available and will contact a provider for emergencies or if you are experiencing any of the above symptoms.  Patient does express an interest in My Chart. My Chart log in information explained on the after visit summary printout at the [facility] [address] desk.     Isaac Chambers RN

## 2022-06-20 NOTE — PROGRESS NOTES
[facility] Hematology & Oncology: Office Visit Progress Note    Chief Complaint:     GIST      History of Present Illness:   Reason for Referral: Gastrointestinal stromal  tumor (GIST); Secondary malignant neoplasm of liver and intrahepatic bile duct; Iron deficiency anemia due to chronic blood loss       Referring Provider: Minesh Lopes NP       Primary Care Provider: Shekhar Mims NP       Family History of Cancer/Hematologic Disorders: Family history is significant for lung cancer, throat/neck cancer, and acute leukemia.        Presenting Symptoms: Fatigue, constipation, nausea, and abdominal distension       Ms. Ross is a 80 y.o. white female with a history of anemia with history of blood transfusion, HTN, fibroid removal, and myomectomy. Patient  was admitted at [address] [place] [address] in [place] in March of 2020 for severe anemia and GI bleed. Work-up included CT scan of the abdomen and pelvis on 3/16/2020 which identified a large soft tissue mass in the posterior margin of the greater curvature measuring  17.9 cm with hypodensity in the left hepatic lobe reflective of metastatic lesion and soft tissue lesion adjacent to the right posterior hepatic lobe suggestive of metastatic implant. On 3/18/20 she underwent upper GI endoscopy with biopsy of the gastric  body mass with pathology revealing a gastrointestinal stromal tumor. Immunohistochemistry was strongly positive for  confirming GIST. Flow cytometric analysis showed mild granulocytic left shift and mild monocytosis; and elevated T cell CD4/CD8 ratio,  with no definitive immunophenotypic evidence of lymphoproliferative disease. During hospitalization, CEA was 0.8 and HGB dropped to 6.0. She received several units of PRBCs. She also received IV iron infusions, as oral iron had been ineffective.  She was  planned for a colonoscopy; however, she declined the procedure.        Upon discharge, she was seen outpatient by Oncologist, Dr. Dru Gibbs, at [facility]. She started treatment for GIST with Imatinib on 5/24/2020.  CT of the abdomen and pelvis on 9/21/20 when compared to CT of 3/16/20 showed  a significant initial response to treatment with the LUQ tumor measuring 15.5 x 12.6 x 9.9 cm in March of 2020 and measuring 10.3 x 9.9. 3.5 cm in September of 2020. CT of the abdomen and pelvis on 2/17/21 and 8/27/21 showed a stable appearance of gastric  neoplasm with no progression of disease. Patient continued to tolerate Imatinib well.        On 11/8/21, CT A/P imaging showed signs of disease progression. In view of progression, treatment was changed to Sunitinib with does reduction to 37.5 mg daily, 4 weeks on, 2 weeks off. ECHO on 12/9/21 showed an EF of 60% and patient was subsequently  started on Sutent. Unfortunately, she experienced severe body aches, peeling skin and felt miserable, and Sutent was discontinued.  Patient was given information about Stivarga, and she initially declined to start this. However, on 2/21/22, CT of the  abdomen and pelvis showed multiple large partially cystic masses throughout the abdomen and pelvis which had significantly progressed compared to imaging on 11/8/21. Patient was started on Stivarga 40 mg on 3/10/22.         Labs drawn on 2/23/22 showed mild worsening of anemia. Patient reported intermittent bloody stools, but she declined GI referral. Iron panel showed iron deficiency and IV Infed x 4 doses was ordered. Patient received the first dose on 3/30/22. No documentation  was sent to indicate that she received the remaining 3 doses.        Most recent labs drawn on 3/31/22 were significant for HGB 11.4, HCT 34.5, RDW 18.6, , and monocytes 10.1. Patient was most recently seen by Dr. Maria De Jesus Lott on 3/30/22.  Patient has since relocated from [place] to the [facility], and now presents  to [facility], per referral from primary care, to establish oncology care for continued management of gastrointestinal stromal tumor, secondary malignant neoplasm of liver and intrahepatic  bile duct, and iron deficiency anemia due to chronic blood loss. She continues on Stivarga 40 mg.        SURGICAL PATHOLOGY 3/18/2020                 FLOW CYTOMETRY ANALYSIS 3/18/2020            CT ABDOMEN AND PELVIS W CONTRAST 11/8/21                               CT CHEST WITH CONTRAST 11/8/21                 CT OF THE ABDOMEN AND PELVIS WITHOUT IV CONTRAST 2/21/22                      CMP 1/6/22            CBC 3/9/22                 CBC 3/31/22                CT 6/17/22:          Review of Systems:      Constitutional  Denies fever or chills.     Fatigue, weight loss, anorexia        HEENT  Denies trauma, hearing loss, ear pain, nosebleeds, sore throat, neck pain and ear discharge.    Change in vision         Skin  Rash, itching, blister     Lungs  Denies shortness of breath, cough, sputum production or hemoptysis. Cardiovascular  Denies palpitations, orthopnea, claudication and leg swelling. Chest pain/pressure     Gastrointestinal  Denies nausea, vomiting. Denies abdominal pain. Constipation, dark black/bloody stools       Denies dysuria, or hesitancy of urination   Frequency      Neuro  Denies ataxia. Denies dizziness, tremors, speech change, focal weakness. Neuropathy, headaches     Hematology  Denies nasal/gum bleeding, denies easy bruise     Endo  Denies heat/cold intolerance, denies diabetes. MSK  Denies back pain, swollen legs, and falls. Joint pain     Psychiatric/Behavioral  Denies depression and substance abuse.  The patient is not nervous/anxious.              No Known Allergies  Past Medical History:   Diagnosis Date    Anemia     GIST (gastrointestinal stromal tumor), malignant ([address]     Hypertension     Stomach cancer ([address]     GIST     Past Surgical History:   Procedure Laterality Date    MYOMECTOMY      OTHER SURGICAL HISTORY      removal of fibroid    UPPER GASTROINTESTINAL ENDOSCOPY  UPPER GASTROINTESTINAL ENDOSCOPY       Family History   Problem Relation Age of Onset    Lung Cancer Mother     Diabetes Mother     No Known Problems Brother      Social History     Socioeconomic History    Marital status: Single     Spouse name: Not on file    Number of children: Not on file    Years of education: Not on file    Highest education level: Not on file   Occupational History    Not on file   Tobacco Use    Smoking status: Never Smoker    Smokeless tobacco: Never Used   Substance and Sexual Activity    Alcohol use: Not Currently    Drug use: Not Currently    Sexual activity: Not on file   Other Topics Concern    Not on file   Social History Narrative    Not on file     Social Determinants of Health     Financial Resource Strain:     Difficulty of Paying Living Expenses: Not on file   Food Insecurity:     Worried About Running Out of Food in the Last Year: Not on file    Min of Food in the Last Year: Not on file   Transportation Needs:     Lack of Transportation (Medical): Not on file    Lack of Transportation (Non-Medical):  Not on file   Physical Activity:     Days of Exercise per Week: Not on file    Minutes of Exercise per Session: Not on file   Stress:     Feeling of Stress : Not on file   Social Connections:     Frequency of Communication with Friends and Family: Not on file    Frequency of Social Gatherings with Friends and Family: Not on file    Attends Jew Services: Not on file    Active Member of Clubs or Organizations: Not on file    Attends Club or Organization Meetings: Not on file    Marital Status: Not on file   Intimate Partner Violence:     Fear of Current or Ex-Partner: Not on file    Emotionally Abused: Not on file    Physically Abused: Not on file    Sexually Abused: Not on file   Housing Stability:     Unable to Pay for Housing in the Last Year: Not on file    Number of [place] in the Last Year: Not on file    Unstable Housing in the Last Year: Not on file     Current Outpatient Medications   Medication Sig Dispense Refill    STIVARGA 40 MG tablet       losartan (COZAAR) 25 MG tablet Take 1 tablet by mouth daily 90 tablet 0    metoprolol succinate (TOPROL XL) 25 MG extended release tablet Take 25 mg by mouth daily      urea (CARMOL) 10 % cream Apply topically 2 times daily Apply topically as needed. (Patient not taking: Reported on 6/20/2022) 453 g 0     No current facility-administered medications for this visit. Facility-Administered Medications Ordered in Other Visits   Medication Dose Route Frequency Provider Last Rate Last Admin    diatrizoate meglumine-sodium (GASTROGRAFIN) 66-10 % solution 15 mL  15 mL Oral ONCE PRN Savanna Escudero MD   15 mL at 06/17/22 1323       OBJECTIVE:  BP (!) 185/91 (Site: Right Upper Arm, Position: Standing, Cuff Size: Medium Adult)   Pulse 75   Temp 97.8 °F (36.6 °C) (Oral)   Resp 16   Ht 5' 2\" (1.575 m)   Wt 115 lb 6.4 oz (52.3 kg)   SpO2 99%   Breastfeeding No   BMI 21.11 kg/m²     Physical Exam:  Constitutional: Oriented to person, place, and time. Thin. Well-nourished. HEENT: Normocephalic and atraumatic. Oropharynx is clear and moist.   Conjunctivae and EOM are normal. Pupils are equal, round, and reactive to light. No scleral icterus. Neck supple. No JVD present. No tracheal deviation present. No thyromegaly present. Lymph node No palpable submandibular, cervical, supraclavicular, axillary and inguinal lymph nodes. Skin  calluses and blisters on bottom of both feet. Warm and dry. No bruising and no rash noted. No erythema. No pallor. Respiratory Effort normal and breath sounds normal.  No respiratory distress. No wheezes. No rales. No tenderness. CVS Normal rate, regular rhythm and normal heart sounds. Exam reveals no gallop, no friction and no rub. No murmur heard. Abdomen Soft. Bowel sounds are normal. Exhibits no distension. There is no tenderness.  There is no rebound and no guarding. Neuro Normal reflexes. No cranial nerve deficit. Exhibits normal muscle tone, 5 of 5 strength of all extremities. MSK Normal range of motion in general.  No edema and no tenderness.    Psych Normal mood, affect, behavior, judgment and thought content      Labs:  Recent Results (from the past 24 hour(s))   CBC with Auto Differential    Collection Time: 06/20/22 10:24 AM   Result Value Ref Range    WBC 7.1 4.3 - 11.1 K/uL    RBC 5.20 4.05 - 5.2 M/uL    Hemoglobin 13.0 11.7 - 15.4 g/dL    Hematocrit 42.2 35.8 - 46.3 %    MCV 81.2 79.6 - 97.8 FL    MCH 25.0 (L) 26.1 - 32.9 PG    MCHC 30.8 (L) 31.4 - 35.0 g/dL    RDW 21.2 (H) 11.9 - 14.6 %    Platelets 984 107 - 372 K/uL    MPV 8.5 (L) 9.4 - 12.3 FL    nRBC 0.00 0.0 - 0.2 K/uL    Seg Neutrophils 67 43 - 78 %    Lymphocytes 23 13 - 44 %    Monocytes 7 4.0 - 12.0 %    Eosinophils % 2 0.5 - 7.8 %    Basophils 1 0.0 - 2.0 %    Immature Granulocytes 0 0.0 - 5.0 %    Segs Absolute 4.8 1.7 - 8.2 K/UL    Absolute Lymph # 1.7 0.5 - 4.6 K/UL    Absolute Mono # 0.5 0.1 - 1.3 K/UL    Absolute Eos # 0.2 0.0 - 0.8 K/UL    Basophils Absolute 0.0 0.0 - 0.2 K/UL    Absolute Immature Granulocyte 0.0 0.0 - 0.5 K/UL    Differential Type AUTOMATED     Comprehensive Metabolic Panel    Collection Time: 06/20/22 10:24 AM   Result Value Ref Range    Sodium 139 136 - 145 mmol/L    Potassium 3.8 3.5 - 5.1 mmol/L    Chloride 105 98 - 107 mmol/L    CO2 29 21 - 32 mmol/L    Anion Gap 5 (L) 7 - 16 mmol/L    Glucose 122 (H) 65 - 100 mg/dL    BUN 17 8 - 23 MG/DL    CREATININE 0.70 0.6 - 1.0 MG/DL    GFR African American >60 >60 ml/min/1.73m2    GFR Non- >60 >60 ml/min/1.73m2    Calcium 8.7 8.3 - 10.4 MG/DL    Total Bilirubin 0.6 0.2 - 1.1 MG/DL    ALT 17 12 - 65 U/L    AST 21 15 - 37 U/L    Alk Phosphatase 63 50 - 136 U/L    Total Protein 7.2 6.3 - 8.2 g/dL    Albumin 3.3 3.2 - 4.6 g/dL    Globulin 3.9 (H) 2.3 - 3.5 g/dL    Albumin/Globulin Ratio 0.8 (L) 1.2 - 3.5     Magnesium    Collection Time: 06/20/22 10:24 AM   Result Value Ref Range    Magnesium 2.1 1.8 - 2.4 mg/dL       Imaging:  No results found for this or any previous visit. ASSESSMENT/PLAN:   Diagnosis Orders   1. Gastrointestinal stromal tumor (GIST) (HCC)  Vitamin B1, Whole Blood    CBC with Auto Differential    Comprehensive Metabolic Panel   2. Liver metastasis ([address]     3. Hand foot syndrome     4. Uncontrolled hypertension     5. High risk medication use     6. Weight loss       80 y.o. F consulted for metastatic GIST presented to [facility] with sister on 5/3/2022. She was diagnosed of large stomach GIST  and the liver metastasis in 3/2020, started [address] in 5/2020 with good response until 11/2021, did not try to increase dose of imatinib and changed to sunitinib which was poorly tolerated and stopped quickly, follow-up imaging showed disease progression  and started regorafenib from 3/2022, relocated to [place] to join sister to help with her care given she struggles to manage her medication and visits due to the Alzheimer's. We discussed her current dose of Regorafenib was dose reduced but will  continue current 120 mg dose and repeat CT in a month, will obtain image from [place] to compare, may consider rechallenge with higher dose of imatinib 800 mg daily given the good tolerance and efficacy previously, may repeat biopsy for mutation screening,  she also had complaining of headache but declined brain MRI. Return next month but call as needed.     She rescheduled that the CT and did not return before starting cycle 4, called to working on 6/6/2022 complaining of pain and blister on bottom of feet, which did not seem typical spread of HFS but there is calluses with blisters and pain, which was consistent to her poor foot care when she forgot to bring her shoes from [place] till her brother sent them over, prescribed urea lotion and discussed saturate with lotion and wear socks and comfortable shoes, would not change dose for Stivarga yet and returned 2 weeks later showed much improved on both feet after good foot care was done, continue use of urea lotion, repeat CT 6/17/2022 showed stable disease, discussed with patient/sister/brother to continue current treatment, discussed the need better hypertension control and add losartan, discussed compliance improvement and will try new pillboxes, discussed weight loss and proper nutrition, proceed to next cycle of regorafenib and return in a month but call as needed. Review/visit/discussion/management/documentation over 40 minutes. All questions are answered to their satisfaction. They will call for further questions and concerns. ECOG PERFORMANCE STATUS - 1- Restricted in physically strenuous activity but ambulatory and able to carry out work of a light or sedentary nature such as light house work, office work. Pain - 2/10. Mild to moderate pain, requiring medication - see MAR     Fatigue - No flowsheet data found. Distress - No flowsheet data found. Elements of this note have been dictated via voice recognition software. Text and phrases may be limited by the accuracy and autoconversion of the software. The chart has been reviewed, but errors may still be present. Brit Sanches M.D.   [address] [place], [address] [facility]  Office : [phone]  Fax : [phone]

## 2022-06-21 ENCOUNTER — HOME HEALTH ADMISSION (OUTPATIENT)
Dept: HOME HEALTH SERVICES | Facility: HOME HEALTH | Age: 72
End: 2022-06-21
Payer: MEDICARE

## 2022-06-21 ENCOUNTER — OFFICE VISIT (OUTPATIENT)
Dept: INTERNAL MEDICINE CLINIC | Facility: CLINIC | Age: 72
End: 2022-06-21
Payer: MEDICARE

## 2022-06-21 VITALS
OXYGEN SATURATION: 98 % | BODY MASS INDEX: 21.53 KG/M2 | HEIGHT: 62 IN | WEIGHT: 117 LBS | SYSTOLIC BLOOD PRESSURE: 162 MMHG | TEMPERATURE: 97.3 F | HEART RATE: 84 BPM | DIASTOLIC BLOOD PRESSURE: 80 MMHG

## 2022-06-21 DIAGNOSIS — C78.7 LIVER METASTASIS (HCC): ICD-10-CM

## 2022-06-21 DIAGNOSIS — G31.84 COGNITIVE IMPAIRMENT, MILD, SO STATED: ICD-10-CM

## 2022-06-21 DIAGNOSIS — R51.9 WORSENING HEADACHES: ICD-10-CM

## 2022-06-21 DIAGNOSIS — C49.A0 GASTROINTESTINAL STROMAL TUMOR (GIST) (HCC): Primary | ICD-10-CM

## 2022-06-21 DIAGNOSIS — I10 PRIMARY HYPERTENSION: ICD-10-CM

## 2022-06-21 DIAGNOSIS — D50.0 IRON DEFICIENCY ANEMIA DUE TO CHRONIC BLOOD LOSS: ICD-10-CM

## 2022-06-21 DIAGNOSIS — R41.3 MEMORY LOSS: ICD-10-CM

## 2022-06-21 PROCEDURE — 1123F ACP DISCUSS/DSCN MKR DOCD: CPT | Performed by: NURSE PRACTITIONER

## 2022-06-21 PROCEDURE — G8400 PT W/DXA NO RESULTS DOC: HCPCS | Performed by: NURSE PRACTITIONER

## 2022-06-21 PROCEDURE — 99214 OFFICE O/P EST MOD 30 MIN: CPT | Performed by: NURSE PRACTITIONER

## 2022-06-21 PROCEDURE — G8427 DOCREV CUR MEDS BY ELIG CLIN: HCPCS | Performed by: NURSE PRACTITIONER

## 2022-06-21 PROCEDURE — G8420 CALC BMI NORM PARAMETERS: HCPCS | Performed by: NURSE PRACTITIONER

## 2022-06-21 PROCEDURE — 3017F COLORECTAL CA SCREEN DOC REV: CPT | Performed by: NURSE PRACTITIONER

## 2022-06-21 PROCEDURE — 1090F PRES/ABSN URINE INCON ASSESS: CPT | Performed by: NURSE PRACTITIONER

## 2022-06-21 PROCEDURE — 1036F TOBACCO NON-USER: CPT | Performed by: NURSE PRACTITIONER

## 2022-06-21 RX ORDER — LOSARTAN POTASSIUM 50 MG/1
50 TABLET ORAL DAILY
Qty: 30 TABLET | Refills: 0
Start: 2022-06-21 | End: 2022-07-06

## 2022-06-21 RX ORDER — VITAMIN B COMPLEX
1 CAPSULE ORAL DAILY
COMMUNITY

## 2022-06-21 RX ORDER — IRON 18 MG
18 TABLET ORAL DAILY
COMMUNITY

## 2022-06-21 ASSESSMENT — PATIENT HEALTH QUESTIONNAIRE - PHQ9
3. TROUBLE FALLING OR STAYING ASLEEP: 1
2. FEELING DOWN, DEPRESSED OR HOPELESS: 1
SUM OF ALL RESPONSES TO PHQ QUESTIONS 1-9: 14
SUM OF ALL RESPONSES TO PHQ9 QUESTIONS 1 & 2: 3
SUM OF ALL RESPONSES TO PHQ QUESTIONS 1-9: 14
1. LITTLE INTEREST OR PLEASURE IN DOING THINGS: 2
5. POOR APPETITE OR OVEREATING: 2
6. FEELING BAD ABOUT YOURSELF - OR THAT YOU ARE A FAILURE OR HAVE LET YOURSELF OR YOUR FAMILY DOWN: 1
SUM OF ALL RESPONSES TO PHQ QUESTIONS 1-9: 14
SUM OF ALL RESPONSES TO PHQ QUESTIONS 1-9: 14
8. MOVING OR SPEAKING SO SLOWLY THAT OTHER PEOPLE COULD HAVE NOTICED. OR THE OPPOSITE, BEING SO FIGETY OR RESTLESS THAT YOU HAVE BEEN MOVING AROUND A LOT MORE THAN USUAL: 3
7. TROUBLE CONCENTRATING ON THINGS, SUCH AS READING THE NEWSPAPER OR WATCHING TELEVISION: 1
9. THOUGHTS THAT YOU WOULD BE BETTER OFF DEAD, OR OF HURTING YOURSELF: 0
10. IF YOU CHECKED OFF ANY PROBLEMS, HOW DIFFICULT HAVE THESE PROBLEMS MADE IT FOR YOU TO DO YOUR WORK, TAKE CARE OF THINGS AT HOME, OR GET ALONG WITH OTHER PEOPLE: 1
4. FEELING TIRED OR HAVING LITTLE ENERGY: 3

## 2022-06-21 ASSESSMENT — ENCOUNTER SYMPTOMS: BLURRED VISION: 0

## 2022-06-21 NOTE — PROGRESS NOTES
Aleshia Ross (:  1950) is a 80 y.o. female,Established patient, here for evaluation of the following chief complaint(s):  Hypertension and Medication Adjustment         ASSESSMENT/PLAN:  1. Gastrointestinal stromal tumor (GIST) ([facility]  -     [facility]  -     [facility]  2. Primary hypertension  -     [facility]  3. Iron deficiency anemia due to chronic blood loss  -     [facility]  4. Liver metastasis ([facility]  5. Worsening headaches  -     MRI BRAIN WO CONTRAST; Future  6. Memory loss  7. Cognitive impairment, mild, so stated      No follow-ups on file. Reviewed CT from hem/onc  Refer again to gastro, she wasn't seen yet locally  bp worsened, possibly from stivarga  Increase losartan, she just started yesterday   Get home health to help monitor med compliance and home assessment  Get MRI for headaches and memory loss  F/u 4 week or as needed      Subjective   SUBJECTIVE/OBJECTIVE:  Patient is here for follow up of HTN. She has been high this week. She was started on losartan 25mg yesterday. Her bp is a little lower today. She is takign stivarga. She often opts to not take or forgets to take but her sister and brother are trying to watch and make sure she takes daily. She also has had bad headaches and worse memory the last few weeks. The headaches are in temples and eyes and head. Hypertension  This is a chronic problem. The problem has been rapidly worsening since onset. The problem is uncontrolled. Associated symptoms include headaches and malaise/fatigue. Pertinent negatives include no anxiety, blurred vision or chest pain. Compliance problems include psychosocial issues. Review of Systems   Constitutional: Positive for malaise/fatigue. Eyes: Negative for blurred vision. Cardiovascular: Negative for chest pain. Neurological: Positive for headaches. Objective   Physical Exam  Vitals reviewed. Constitutional:       Appearance: Normal appearance. HENT:      Head: Normocephalic. Eyes:      Extraocular Movements: Extraocular movements intact. Pupils: Pupils are equal, round, and reactive to light. Cardiovascular:      Rate and Rhythm: Normal rate. Pulmonary:      Effort: Pulmonary effort is normal.   Abdominal:      General: There is distension. Musculoskeletal:      Cervical back: Neck supple. Skin:     General: Skin is warm and dry. Neurological:      General: No focal deficit present. Mental Status: She is alert and oriented to person, place, and time. Psychiatric:         Mood and Affect: Mood normal.         Behavior: Behavior normal.                  An electronic signature was used to authenticate this note.     --Shirin Lew, MARITZA - CNP

## 2022-06-24 ENCOUNTER — HOME CARE VISIT (OUTPATIENT)
Dept: SCHEDULING | Facility: HOME HEALTH | Age: 72
End: 2022-06-24
Payer: MEDICARE

## 2022-06-24 VITALS
SYSTOLIC BLOOD PRESSURE: 132 MMHG | DIASTOLIC BLOOD PRESSURE: 78 MMHG | TEMPERATURE: 98 F | HEART RATE: 77 BPM | RESPIRATION RATE: 17 BRPM | OXYGEN SATURATION: 98 %

## 2022-06-24 PROCEDURE — G0151 HHCP-SERV OF PT,EA 15 MIN: HCPCS

## 2022-06-24 PROCEDURE — 1090000002 HH PPS REVENUE DEBIT

## 2022-06-24 PROCEDURE — 0221000100 HH NO PAY CLAIM PROCEDURE

## 2022-06-24 PROCEDURE — 400013 HH SOC

## 2022-06-24 PROCEDURE — 1090000001 HH PPS REVENUE CREDIT

## 2022-06-24 ASSESSMENT — ENCOUNTER SYMPTOMS
PAIN LOCATION - PAIN QUALITY: ACHY
DYSPNEA ACTIVITY LEVEL: AFTER AMBULATING MORE THAN 20 FT

## 2022-06-25 PROCEDURE — 1090000001 HH PPS REVENUE CREDIT

## 2022-06-25 PROCEDURE — 1090000002 HH PPS REVENUE DEBIT

## 2022-06-26 PROCEDURE — 1090000001 HH PPS REVENUE CREDIT

## 2022-06-26 PROCEDURE — 1090000002 HH PPS REVENUE DEBIT

## 2022-06-27 PROCEDURE — 1090000002 HH PPS REVENUE DEBIT

## 2022-06-27 PROCEDURE — 1090000001 HH PPS REVENUE CREDIT

## 2022-06-28 ENCOUNTER — HOME CARE VISIT (OUTPATIENT)
Dept: SCHEDULING | Facility: HOME HEALTH | Age: 72
End: 2022-06-28
Payer: MEDICARE

## 2022-06-28 VITALS
SYSTOLIC BLOOD PRESSURE: 128 MMHG | TEMPERATURE: 97.5 F | HEART RATE: 85 BPM | DIASTOLIC BLOOD PRESSURE: 70 MMHG | RESPIRATION RATE: 17 BRPM

## 2022-06-28 VITALS
TEMPERATURE: 97.3 F | OXYGEN SATURATION: 98 % | DIASTOLIC BLOOD PRESSURE: 70 MMHG | RESPIRATION RATE: 17 BRPM | SYSTOLIC BLOOD PRESSURE: 140 MMHG | HEART RATE: 98 BPM

## 2022-06-28 PROCEDURE — 1090000001 HH PPS REVENUE CREDIT

## 2022-06-28 PROCEDURE — G0157 HHC PT ASSISTANT EA 15: HCPCS

## 2022-06-28 PROCEDURE — G0299 HHS/HOSPICE OF RN EA 15 MIN: HCPCS

## 2022-06-28 PROCEDURE — 1090000002 HH PPS REVENUE DEBIT

## 2022-06-28 PROCEDURE — G0155 HHCP-SVS OF CSW,EA 15 MIN: HCPCS

## 2022-06-28 ASSESSMENT — ENCOUNTER SYMPTOMS
CONSTIPATION: 1
SKIN LESIONS: 1
ABDOMINAL PAIN: 1

## 2022-06-29 PROCEDURE — 1090000001 HH PPS REVENUE CREDIT

## 2022-06-29 PROCEDURE — 1090000002 HH PPS REVENUE DEBIT

## 2022-06-30 PROCEDURE — 1090000002 HH PPS REVENUE DEBIT

## 2022-06-30 PROCEDURE — 1090000001 HH PPS REVENUE CREDIT

## 2022-07-01 ENCOUNTER — HOSPITAL ENCOUNTER (OUTPATIENT)
Dept: MRI IMAGING | Age: 72
Discharge: HOME | End: 2022-07-04
Payer: MEDICARE

## 2022-07-01 DIAGNOSIS — R51.9 WORSENING HEADACHES: ICD-10-CM

## 2022-07-01 PROCEDURE — 70551 MRI BRAIN STEM W/O DYE: CPT

## 2022-07-01 PROCEDURE — 1090000002 HH PPS REVENUE DEBIT

## 2022-07-01 PROCEDURE — 1090000001 HH PPS REVENUE CREDIT

## 2022-07-02 PROCEDURE — 1090000001 HH PPS REVENUE CREDIT

## 2022-07-02 PROCEDURE — 1090000002 HH PPS REVENUE DEBIT

## 2022-07-03 PROCEDURE — 1090000002 HH PPS REVENUE DEBIT

## 2022-07-03 PROCEDURE — 1090000001 HH PPS REVENUE CREDIT

## 2022-07-04 PROCEDURE — 1090000001 HH PPS REVENUE CREDIT

## 2022-07-04 PROCEDURE — 1090000002 HH PPS REVENUE DEBIT

## 2022-07-05 PROCEDURE — 1090000001 HH PPS REVENUE CREDIT

## 2022-07-05 PROCEDURE — 1090000002 HH PPS REVENUE DEBIT

## 2022-07-06 ENCOUNTER — HOME CARE VISIT (OUTPATIENT)
Dept: SCHEDULING | Facility: HOME HEALTH | Age: 72
End: 2022-07-06
Payer: MEDICARE

## 2022-07-06 ENCOUNTER — TELEPHONE (OUTPATIENT)
Dept: INTERNAL MEDICINE CLINIC | Facility: CLINIC | Age: 72
End: 2022-07-06

## 2022-07-06 ENCOUNTER — HOME CARE VISIT (OUTPATIENT)
Dept: HOME HEALTH SERVICES | Facility: HOME HEALTH | Age: 72
End: 2022-07-06
Payer: MEDICARE

## 2022-07-06 VITALS
DIASTOLIC BLOOD PRESSURE: 96 MMHG | RESPIRATION RATE: 16 BRPM | TEMPERATURE: 97.8 F | SYSTOLIC BLOOD PRESSURE: 188 MMHG | OXYGEN SATURATION: 99 % | HEART RATE: 83 BPM

## 2022-07-06 VITALS
OXYGEN SATURATION: 99 % | DIASTOLIC BLOOD PRESSURE: 90 MMHG | RESPIRATION RATE: 16 BRPM | HEART RATE: 90 BPM | SYSTOLIC BLOOD PRESSURE: 178 MMHG | TEMPERATURE: 97.8 F

## 2022-07-06 PROCEDURE — 1090000002 HH PPS REVENUE DEBIT

## 2022-07-06 PROCEDURE — G0299 HHS/HOSPICE OF RN EA 15 MIN: HCPCS

## 2022-07-06 PROCEDURE — 1090000001 HH PPS REVENUE CREDIT

## 2022-07-06 PROCEDURE — G0157 HHC PT ASSISTANT EA 15: HCPCS

## 2022-07-06 RX ORDER — METOPROLOL SUCCINATE 25 MG/1
25 TABLET, EXTENDED RELEASE ORAL DAILY
Qty: 30 TABLET | Refills: 5 | Status: SHIPPED | OUTPATIENT
Start: 2022-07-06

## 2022-07-06 RX ORDER — LOSARTAN POTASSIUM 100 MG/1
100 TABLET ORAL DAILY
Qty: 30 TABLET | Refills: 5 | Status: SHIPPED | OUTPATIENT
Start: 2022-07-06

## 2022-07-06 RX ORDER — LOSARTAN POTASSIUM 100 MG/1
100 TABLET ORAL DAILY
COMMUNITY
End: 2022-07-06 | Stop reason: SDUPTHER

## 2022-07-06 ASSESSMENT — ENCOUNTER SYMPTOMS
PAIN LOCATION - PAIN QUALITY: SORNESS
STOOL DESCRIPTION: FORMED

## 2022-07-06 NOTE — TELEPHONE ENCOUNTER
Patient sister Kandice Finch notified we have changed Rx for Losartan to 100 mg she will take once a day. Patient needs refill on her metoprolol 25 we have sent into the pharmacy. The patient did make a complaint about her feet bothering her, but they seem to be doing better. So she will call to move appt up if needed.

## 2022-07-06 NOTE — TELEPHONE ENCOUNTER
Received call from [address] [place] [address] with [facility] stating patient BP elevated when she went to see her this afternoon about 2:20 pm. She stated it was 178/90 in both arms and used a home cuff to verify. Physical Therapist went out an hour later to see her BP was 188/96. Spoke with Julio Cesar Vines she would like patient to increase to Losartan 100 mg, and keep her follow up on the 19 th so we can check her BP.

## 2022-07-07 PROCEDURE — 1090000001 HH PPS REVENUE CREDIT

## 2022-07-07 PROCEDURE — 1090000002 HH PPS REVENUE DEBIT

## 2022-07-08 ENCOUNTER — HOME CARE VISIT (OUTPATIENT)
Dept: SCHEDULING | Facility: HOME HEALTH | Age: 72
End: 2022-07-08
Payer: MEDICARE

## 2022-07-08 PROCEDURE — 1090000002 HH PPS REVENUE DEBIT

## 2022-07-08 PROCEDURE — 1090000001 HH PPS REVENUE CREDIT

## 2022-07-08 PROCEDURE — G0299 HHS/HOSPICE OF RN EA 15 MIN: HCPCS

## 2022-07-09 PROCEDURE — 1090000001 HH PPS REVENUE CREDIT

## 2022-07-09 PROCEDURE — 1090000002 HH PPS REVENUE DEBIT

## 2022-07-10 VITALS
SYSTOLIC BLOOD PRESSURE: 140 MMHG | HEART RATE: 80 BPM | TEMPERATURE: 97.8 F | OXYGEN SATURATION: 98 % | DIASTOLIC BLOOD PRESSURE: 82 MMHG | RESPIRATION RATE: 18 BRPM

## 2022-07-10 PROCEDURE — 1090000001 HH PPS REVENUE CREDIT

## 2022-07-10 PROCEDURE — 1090000002 HH PPS REVENUE DEBIT

## 2022-07-10 ASSESSMENT — ENCOUNTER SYMPTOMS
STOOL DESCRIPTION: FORMED
PAIN LOCATION - PAIN QUALITY: SORENESS

## 2022-07-11 ENCOUNTER — COMMUNITY OUTREACH (OUTPATIENT)
Dept: INTERNAL MEDICINE CLINIC | Facility: CLINIC | Age: 72
End: 2022-07-11

## 2022-07-11 PROCEDURE — 1090000001 HH PPS REVENUE CREDIT

## 2022-07-11 PROCEDURE — 1090000002 HH PPS REVENUE DEBIT

## 2022-07-11 NOTE — PROGRESS NOTES
Patient's HM shows they are overdue for Colorectal Screening. Care Everywhere and  files searched without success. No results to attach to order nor HM updated.        Hm did not show pt was due for mammogram.  Troy Cho

## 2022-07-11 NOTE — PROGRESS NOTES
Patient's HM shows they are overdue for Mammogram, Colorectal Screening and Cervical Cancer Screening. [employer] and  files searched without success. No results to attach to order nor HM updated.      Gary Henderson

## 2022-07-12 PROCEDURE — 1090000002 HH PPS REVENUE DEBIT

## 2022-07-12 PROCEDURE — 1090000001 HH PPS REVENUE CREDIT

## 2022-07-13 ENCOUNTER — HOME CARE VISIT (OUTPATIENT)
Dept: SCHEDULING | Facility: HOME HEALTH | Age: 72
End: 2022-07-13
Payer: MEDICARE

## 2022-07-13 VITALS
RESPIRATION RATE: 15 BRPM | SYSTOLIC BLOOD PRESSURE: 134 MMHG | TEMPERATURE: 98.8 F | HEART RATE: 68 BPM | OXYGEN SATURATION: 98 % | DIASTOLIC BLOOD PRESSURE: 78 MMHG

## 2022-07-13 PROCEDURE — 1090000001 HH PPS REVENUE CREDIT

## 2022-07-13 PROCEDURE — G0151 HHCP-SERV OF PT,EA 15 MIN: HCPCS

## 2022-07-13 PROCEDURE — 1090000002 HH PPS REVENUE DEBIT

## 2022-07-13 ASSESSMENT — ENCOUNTER SYMPTOMS
DYSPNEA ACTIVITY LEVEL: AFTER AMBULATING MORE THAN 20 FT
SKIN LESIONS: 1

## 2022-07-19 ENCOUNTER — OFFICE VISIT (OUTPATIENT)
Dept: INTERNAL MEDICINE CLINIC | Facility: CLINIC | Age: 72
End: 2022-07-19
Payer: MEDICARE

## 2022-07-19 VITALS
DIASTOLIC BLOOD PRESSURE: 88 MMHG | BODY MASS INDEX: 20.61 KG/M2 | OXYGEN SATURATION: 98 % | HEIGHT: 62 IN | WEIGHT: 112 LBS | SYSTOLIC BLOOD PRESSURE: 132 MMHG | HEART RATE: 73 BPM | TEMPERATURE: 97.9 F

## 2022-07-19 DIAGNOSIS — R41.3 MEMORY LOSS: ICD-10-CM

## 2022-07-19 DIAGNOSIS — L84 CALLUS OF FOOT: Primary | ICD-10-CM

## 2022-07-19 DIAGNOSIS — I10 PRIMARY HYPERTENSION: ICD-10-CM

## 2022-07-19 DIAGNOSIS — C49.A0 GASTROINTESTINAL STROMAL TUMOR (GIST) (HCC): ICD-10-CM

## 2022-07-19 DIAGNOSIS — R73.09 ELEVATED GLUCOSE: ICD-10-CM

## 2022-07-19 DIAGNOSIS — L65.9 HAIR LOSS: ICD-10-CM

## 2022-07-19 PROCEDURE — 3017F COLORECTAL CA SCREEN DOC REV: CPT | Performed by: NURSE PRACTITIONER

## 2022-07-19 PROCEDURE — G8427 DOCREV CUR MEDS BY ELIG CLIN: HCPCS | Performed by: NURSE PRACTITIONER

## 2022-07-19 PROCEDURE — 1123F ACP DISCUSS/DSCN MKR DOCD: CPT | Performed by: NURSE PRACTITIONER

## 2022-07-19 PROCEDURE — G8400 PT W/DXA NO RESULTS DOC: HCPCS | Performed by: NURSE PRACTITIONER

## 2022-07-19 PROCEDURE — 1090F PRES/ABSN URINE INCON ASSESS: CPT | Performed by: NURSE PRACTITIONER

## 2022-07-19 PROCEDURE — G8420 CALC BMI NORM PARAMETERS: HCPCS | Performed by: NURSE PRACTITIONER

## 2022-07-19 PROCEDURE — 1036F TOBACCO NON-USER: CPT | Performed by: NURSE PRACTITIONER

## 2022-07-19 PROCEDURE — 99214 OFFICE O/P EST MOD 30 MIN: CPT | Performed by: NURSE PRACTITIONER

## 2022-07-19 NOTE — PROGRESS NOTES
Aidee Ross (:  1950) is a 80 y.o. female,Established patient, here for evaluation of the following chief complaint(s):  Follow-up (4 weeks/)         ASSESSMENT/PLAN:  1. Callus of foot  2. Primary hypertension  -     Comprehensive Metabolic Panel; Future  -     Lipid Panel; Future  3. Gastrointestinal stromal tumor (GIST) ([address]  4. Memory loss  -     TSH; Future  5. Hair loss  -     TSH; Future  6. Elevated glucose  -     Hemoglobin A1C; Future      No follow-ups on file. Reassure that callouses on feet are not hand/foot/mouth disease   Recommend podiatry, but she declines and will get wide toe box shoes at this time  HTN improved on recheck continue losartan  F/u with oncology, declines gastro   Thinning hair, check TSH on next lab      Subjective   SUBJECTIVE/OBJECTIVE:  Patient is here for follow up of bp. It is up and down on home monitor, some days [id number] systolic and other days high. She is concerned about callouses on her feet that they could be hand/foot/mouth disease. She also is concerned about her heart. She saw cardiology and doesn't want further testing (stress test/cardiac cath). She is just concerned about her heart, occasionally gets left chest pains. She is on stivarga and tolerating well at this time. Review of Systems       Objective   Physical Exam  Vitals reviewed. Constitutional:       Appearance: Normal appearance. HENT:      Head: Normocephalic. Right Ear: External ear normal.      Left Ear: External ear normal.   Eyes:      Extraocular Movements: Extraocular movements intact. Pupils: Pupils are equal, round, and reactive to light. Cardiovascular:      Rate and Rhythm: Normal rate. Pulmonary:      Effort: Pulmonary effort is normal.      Breath sounds: Normal breath sounds. No wheezing. Musculoskeletal:      Cervical back: Neck supple.       Comments: Callous on right great toe and left ball of foot   Skin:     General: Skin is warm and dry.   Neurological:      General: No focal deficit present. Mental Status: She is alert and oriented to person, place, and time. Psychiatric:         Mood and Affect: Mood normal.                An electronic signature was used to authenticate this note.     --MARITZA Bauer - CNP

## 2022-07-20 ENCOUNTER — OFFICE VISIT (OUTPATIENT)
Dept: ONCOLOGY | Age: 72
End: 2022-07-20
Payer: MEDICARE

## 2022-07-20 ENCOUNTER — HOSPITAL ENCOUNTER (OUTPATIENT)
Dept: LAB | Age: 72
Discharge: HOME | End: 2022-07-23
Payer: MEDICARE

## 2022-07-20 VITALS
TEMPERATURE: 98 F | BODY MASS INDEX: 19.3 KG/M2 | DIASTOLIC BLOOD PRESSURE: 90 MMHG | SYSTOLIC BLOOD PRESSURE: 182 MMHG | OXYGEN SATURATION: 99 % | RESPIRATION RATE: 16 BRPM | HEART RATE: 68 BPM | HEIGHT: 62 IN | WEIGHT: 104.9 LBS

## 2022-07-20 DIAGNOSIS — I10 UNCONTROLLED HYPERTENSION: ICD-10-CM

## 2022-07-20 DIAGNOSIS — C78.7 LIVER METASTASIS (HCC): ICD-10-CM

## 2022-07-20 DIAGNOSIS — Z79.899 HIGH RISK MEDICATION USE: ICD-10-CM

## 2022-07-20 DIAGNOSIS — C49.A0 GASTROINTESTINAL STROMAL TUMOR (GIST) (HCC): Primary | ICD-10-CM

## 2022-07-20 DIAGNOSIS — C49.A0 GASTROINTESTINAL STROMAL TUMOR (GIST) (HCC): ICD-10-CM

## 2022-07-20 DIAGNOSIS — B37.0 THRUSH: ICD-10-CM

## 2022-07-20 LAB
ALBUMIN SERPL-MCNC: 3.2 G/DL (ref 3.2–4.6)
ALBUMIN/GLOB SERPL: 0.8 {RATIO} (ref 1.2–3.5)
ALP SERPL-CCNC: 70 U/L (ref 50–136)
ALT SERPL-CCNC: 32 U/L (ref 12–65)
ANION GAP SERPL CALC-SCNC: 5 MMOL/L (ref 7–16)
AST SERPL-CCNC: 31 U/L (ref 15–37)
BASOPHILS # BLD: 0.1 K/UL (ref 0–0.2)
BASOPHILS NFR BLD: 1 % (ref 0–2)
BILIRUB SERPL-MCNC: 0.7 MG/DL (ref 0.2–1.1)
BUN SERPL-MCNC: 12 MG/DL (ref 8–23)
CALCIUM SERPL-MCNC: 9.1 MG/DL (ref 8.3–10.4)
CHLORIDE SERPL-SCNC: 106 MMOL/L (ref 98–107)
CO2 SERPL-SCNC: 27 MMOL/L (ref 21–32)
CREAT SERPL-MCNC: 0.6 MG/DL (ref 0.6–1)
DIFFERENTIAL METHOD BLD: ABNORMAL
EOSINOPHIL # BLD: 0.2 K/UL (ref 0–0.8)
EOSINOPHIL NFR BLD: 3 % (ref 0.5–7.8)
ERYTHROCYTE [DISTWIDTH] IN BLOOD BY AUTOMATED COUNT: 19.9 % (ref 11.9–14.6)
GLOBULIN SER CALC-MCNC: 4.2 G/DL (ref 2.3–3.5)
GLUCOSE SERPL-MCNC: 108 MG/DL (ref 65–100)
HCT VFR BLD AUTO: 41.6 % (ref 35.8–46.3)
HGB BLD-MCNC: 13 G/DL (ref 11.7–15.4)
IMM GRANULOCYTES # BLD AUTO: 0 K/UL (ref 0–0.5)
IMM GRANULOCYTES NFR BLD AUTO: 0 % (ref 0–5)
LYMPHOCYTES # BLD: 1.6 K/UL (ref 0.5–4.6)
LYMPHOCYTES NFR BLD: 21 % (ref 13–44)
MCH RBC QN AUTO: 26.1 PG (ref 26.1–32.9)
MCHC RBC AUTO-ENTMCNC: 31.3 G/DL (ref 31.4–35)
MCV RBC AUTO: 83.4 FL (ref 79.6–97.8)
MONOCYTES # BLD: 0.5 K/UL (ref 0.1–1.3)
MONOCYTES NFR BLD: 6 % (ref 4–12)
NEUTS SEG # BLD: 5.5 K/UL (ref 1.7–8.2)
NEUTS SEG NFR BLD: 69 % (ref 43–78)
NRBC # BLD: 0 K/UL (ref 0–0.2)
PLATELET # BLD AUTO: 348 K/UL (ref 150–450)
PMV BLD AUTO: 8.1 FL (ref 9.4–12.3)
POTASSIUM SERPL-SCNC: 4.2 MMOL/L (ref 3.5–5.1)
PROT SERPL-MCNC: 7.4 G/DL (ref 6.3–8.2)
RBC # BLD AUTO: 4.99 M/UL (ref 4.05–5.2)
SODIUM SERPL-SCNC: 138 MMOL/L (ref 136–145)
WBC # BLD AUTO: 7.8 K/UL (ref 4.3–11.1)

## 2022-07-20 PROCEDURE — G8427 DOCREV CUR MEDS BY ELIG CLIN: HCPCS | Performed by: INTERNAL MEDICINE

## 2022-07-20 PROCEDURE — 99215 OFFICE O/P EST HI 40 MIN: CPT | Performed by: INTERNAL MEDICINE

## 2022-07-20 PROCEDURE — G8420 CALC BMI NORM PARAMETERS: HCPCS | Performed by: INTERNAL MEDICINE

## 2022-07-20 PROCEDURE — 3017F COLORECTAL CA SCREEN DOC REV: CPT | Performed by: INTERNAL MEDICINE

## 2022-07-20 PROCEDURE — 85025 COMPLETE CBC W/AUTO DIFF WBC: CPT

## 2022-07-20 PROCEDURE — 84425 ASSAY OF VITAMIN B-1: CPT

## 2022-07-20 PROCEDURE — 80053 COMPREHEN METABOLIC PANEL: CPT

## 2022-07-20 PROCEDURE — 1123F ACP DISCUSS/DSCN MKR DOCD: CPT | Performed by: INTERNAL MEDICINE

## 2022-07-20 PROCEDURE — 1036F TOBACCO NON-USER: CPT | Performed by: INTERNAL MEDICINE

## 2022-07-20 PROCEDURE — 1090F PRES/ABSN URINE INCON ASSESS: CPT | Performed by: INTERNAL MEDICINE

## 2022-07-20 PROCEDURE — 36415 COLL VENOUS BLD VENIPUNCTURE: CPT

## 2022-07-20 PROCEDURE — G8400 PT W/DXA NO RESULTS DOC: HCPCS | Performed by: INTERNAL MEDICINE

## 2022-07-20 RX ORDER — REGORAFENIB 40 MG/1
120 TABLET, FILM COATED ORAL DAILY
Qty: 63 TABLET | Refills: 3 | Status: SHIPPED | OUTPATIENT
Start: 2022-07-20

## 2022-07-20 ASSESSMENT — PATIENT HEALTH QUESTIONNAIRE - PHQ9
SUM OF ALL RESPONSES TO PHQ QUESTIONS 1-9: 0
2. FEELING DOWN, DEPRESSED OR HOPELESS: 0
SUM OF ALL RESPONSES TO PHQ QUESTIONS 1-9: 0

## 2022-07-20 NOTE — PROGRESS NOTES
[facility] Hematology & Oncology: Office Visit Progress Note    Chief Complaint:     GIST      History of Present Illness:   Reason for Referral: Gastrointestinal stromal  tumor (GIST); Secondary malignant neoplasm of liver and intrahepatic bile duct; Iron deficiency anemia due to chronic blood loss       Referring Provider: Jamaal Gómez NP       Primary Care Provider: Jamaal Gómez NP       Family History of Cancer/Hematologic Disorders: Family history is significant for lung cancer, throat/neck cancer, and acute leukemia. Presenting Symptoms: Fatigue, constipation, nausea, and abdominal distension       Ms. Leoncio Carvajal is a 80 y.o. white female with a history of anemia with history of blood transfusion, HTN, fibroid removal, and myomectomy. Patient  was admitted at [address] in March of 2020 for severe anemia and GI bleed. Work-up included CT scan of the abdomen and pelvis on 3/16/2020 which identified a large soft tissue mass in the posterior margin of the greater curvature measuring  17.9 cm with hypodensity in the left hepatic lobe reflective of metastatic lesion and soft tissue lesion adjacent to the right posterior hepatic lobe suggestive of metastatic implant. On 3/18/20 she underwent upper GI endoscopy with biopsy of the gastric  body mass with pathology revealing a gastrointestinal stromal tumor. Immunohistochemistry was strongly positive for  confirming GIST. Flow cytometric analysis showed mild granulocytic left shift and mild monocytosis; and elevated T cell CD4/CD8 ratio,  with no definitive immunophenotypic evidence of lymphoproliferative disease. During hospitalization, CEA was 0.8 and HGB dropped to 6.0. She received several units of PRBCs. She also received IV iron infusions, as oral iron had been ineffective. She was  planned for a colonoscopy; however, she declined the procedure.         Upon discharge, she was seen outpatient by Oncologist, Dr. Chiquita Hemphill, [facility]. She started treatment for GIST with Imatinib on 5/24/2020. CT of the abdomen and pelvis on 9/21/20 when compared to CT of 3/16/20 showed  a significant initial response to treatment with the LUQ tumor measuring 15.5 x 12.6 x 9.9 cm in March of 2020 and measuring 10.3 x 9.9. 3.5 cm in September of 2020. CT of the abdomen and pelvis on 2/17/21 and 8/27/21 showed a stable appearance of gastric  neoplasm with no progression of disease. Patient continued to tolerate Imatinib well. On 11/8/21, CT A/P imaging showed signs of disease progression. In view of progression, treatment was changed to Sunitinib with does reduction to 37.5 mg daily, 4 weeks on, 2 weeks off. ECHO on 12/9/21 showed an EF of 60% and patient was subsequently  started on Sutent. Unfortunately, she experienced severe body aches, peeling skin and felt miserable, and Sutent was discontinued. Patient was given information about Stivarga, and she initially declined to start this. However, on 2/21/22, CT of the  abdomen and pelvis showed multiple large partially cystic masses throughout the abdomen and pelvis which had significantly progressed compared to imaging on 11/8/21. Patient was started on Stivarga 40 mg on 3/10/22. Labs drawn on 2/23/22 showed mild worsening of anemia. Patient reported intermittent bloody stools, but she declined GI referral. Iron panel showed iron deficiency and IV Infed x 4 doses was ordered. Patient received the first dose on 3/30/22. No documentation  was sent to indicate that she received the remaining 3 doses. Most recent labs drawn on 3/31/22 were significant for HGB 11.4, HCT 34.5, RDW 18.6, , and monocytes 10.1. Patient was most recently seen by Dr. Bridgett Nguyễn on 3/30/22.  Patient has since relocated from [place] to the [facility], and now presents  to [facility], per referral from primary care, to establish oncology care for continued management of gastrointestinal stromal tumor, secondary malignant neoplasm of liver and intrahepatic  bile duct, and iron deficiency anemia due to chronic blood loss. She continues on Stivarga 40 mg.        SURGICAL PATHOLOGY 3/18/2020                 FLOW CYTOMETRY ANALYSIS 3/18/2020            CT ABDOMEN AND PELVIS W CONTRAST 11/8/21                               CT CHEST WITH CONTRAST 11/8/21                 CT OF THE ABDOMEN AND PELVIS WITHOUT IV CONTRAST 2/21/22                      CMP 1/6/22            CBC 3/9/22                 CBC 3/31/22                CT 6/17/22:          Review of Systems:      Constitutional  Denies fever or chills. Fatigue, weight loss, anorexia        HEENT  Denies trauma, hearing loss, ear pain, nosebleeds, sore throat, neck pain and ear discharge. Change in vision         Skin  Rash, itching, blister     Lungs  Denies shortness of breath, cough, sputum production or hemoptysis. Cardiovascular  Denies palpitations, orthopnea, claudication and leg swelling. Chest pain/pressure     Gastrointestinal  Denies nausea, vomiting. Denies abdominal pain. Constipation, dark black/bloody stools       Denies dysuria, or hesitancy of urination   Frequency      Neuro  Denies ataxia. Denies dizziness, tremors, speech change, focal weakness. Neuropathy, headaches     Hematology  Denies nasal/gum bleeding, denies easy bruise     Endo  Denies heat/cold intolerance, denies diabetes. MSK  Denies back pain, swollen legs, and falls. Joint pain     Psychiatric/Behavioral  Denies depression and substance abuse. The patient is not nervous/anxious.               No Known Allergies  Past Medical History:   Diagnosis Date    Anemia     GIST (gastrointestinal stromal tumor), malignant ([address]     Hypertension     Stomach cancer ([address]     GIST     Past Surgical History:   Procedure Laterality Date    MYOMECTOMY      OTHER SURGICAL HISTORY      removal of fibroid    UPPER GASTROINTESTINAL ENDOSCOPY UPPER GASTROINTESTINAL ENDOSCOPY       Family History   Problem Relation Age of Onset    Lung Cancer Mother     Diabetes Mother     No Known Problems Brother      Social History     Socioeconomic History    Marital status: Single     Spouse name: Not on file    Number of children: Not on file    Years of education: Not on file    Highest education level: Not on file   Occupational History    Not on file   Tobacco Use    Smoking status: Never    Smokeless tobacco: Never   Substance and Sexual Activity    Alcohol use: Not Currently    Drug use: Not Currently    Sexual activity: Not on file   Other Topics Concern    Not on file   Social History Narrative    Not on file     Social Determinants of Health     Financial Resource Strain: Not on file   Food Insecurity: Not on file   Transportation Needs: Not on file   Physical Activity: Not on file   Stress: Not on file   Social Connections: Not on file   Intimate Partner Violence: Not on file   Housing Stability: Not on file     Current Outpatient Medications   Medication Sig Dispense Refill    Magic Mouthwash (MIRACLE MOUTHWASH) Swish and spit 5 mLs 4 times daily as needed for Irritation 480 mL 1    STIVARGA 40 MG tablet Take 3 tablets by mouth before bedtime. 21 day cycle-7 days off . 63 tablet 3    acetaminophen (TYLENOL) 500 MG tablet Take 500 mg by mouth every 4 hours as needed for Pain.      losartan (COZAAR) 100 MG tablet Take 1 tablet by mouth daily 30 tablet 5    metoprolol succinate (TOPROL XL) 25 MG extended release tablet Take 1 tablet by mouth daily 30 tablet 5    Ferrous Sulfate (IRON) 90 (18 Fe) MG TABS Take 18 mg by mouth daily      b complex vitamins capsule Take 1 capsule by mouth daily With B1        No current facility-administered medications for this visit.        OBJECTIVE:  BP (!) 182/90 (Site: Left Upper Arm, Position: Standing)   Pulse 68   Temp 98 °F (36.7 °C)   Resp 16   Ht 5' 2\" (1.575 m)   Wt 104 lb 14.4 oz (47.6 kg)   SpO2 99%   BMI 19.19 kg/m²     Physical Exam:  Constitutional: Oriented to person, place, and time. Thin. Well-nourished. HEENT: Normocephalic and atraumatic. Oropharynx is clear and moist.   Conjunctivae and EOM are normal. Pupils are equal, round, and reactive to light. No scleral icterus. Neck supple. No JVD present. No tracheal deviation present. No thyromegaly present. Lymph node No palpable submandibular, cervical, supraclavicular, axillary and inguinal lymph nodes. Skin  calluses and blisters on bottom of both feet. Warm and dry. No bruising and no rash noted. No erythema. No pallor. Respiratory Effort normal and breath sounds normal.  No respiratory distress. No wheezes. No rales. No tenderness. CVS Normal rate, regular rhythm and normal heart sounds. Exam reveals no gallop, no friction and no rub. No murmur heard. Abdomen Soft. Bowel sounds are normal. Exhibits no distension. There is no tenderness. There is no rebound and no guarding. Neuro Normal reflexes. No cranial nerve deficit. Exhibits normal muscle tone, 5 of 5 strength of all extremities. MSK Normal range of motion in general.  No edema and no tenderness.    Psych Normal mood, affect, behavior, judgment and thought content      Labs:  Recent Results (from the past 24 hour(s))   CBC with Auto Differential    Collection Time: 07/20/22 10:54 AM   Result Value Ref Range    WBC 7.8 4.3 - 11.1 K/uL    RBC 4.99 4.05 - 5.2 M/uL    Hemoglobin 13.0 11.7 - 15.4 g/dL    Hematocrit 41.6 35.8 - 46.3 %    MCV 83.4 79.6 - 97.8 FL    MCH 26.1 26.1 - 32.9 PG    MCHC 31.3 (L) 31.4 - 35.0 g/dL    RDW 19.9 (H) 11.9 - 14.6 %    Platelets 432 364 - 369 K/uL    MPV 8.1 (L) 9.4 - 12.3 FL    nRBC 0.00 0.0 - 0.2 K/uL    Seg Neutrophils 69 43 - 78 %    Lymphocytes 21 13 - 44 %    Monocytes 6 4.0 - 12.0 %    Eosinophils % 3 0.5 - 7.8 %    Basophils 1 0.0 - 2.0 %    Immature Granulocytes 0 0.0 - 5.0 %    Segs Absolute 5.5 1.7 - 8.2 K/UL    Absolute Lymph # 1.6 mg dose and repeat CT in a month, will obtain image from [place] to compare, may consider rechallenge with higher dose of imatinib 800 mg daily given the good tolerance and efficacy previously, may repeat biopsy for mutation screening,  she also had complaining of headache but declined brain MRI. Return next month but call as needed. She rescheduled that the CT and did not return before starting cycle 4, called to working on 6/6/2022 complaining of pain and blister on bottom of feet, which did not seem typical spread of HFS but there is calluses with blisters and pain, which was consistent to her poor foot care when she forgot to bring her shoes from [place] till her brother sent them over, prescribed urea lotion and discussed saturate with lotion and wear socks and comfortable shoes, would not change dose for Stivarga yet and returned 2 weeks later showed much improved on both feet after good foot care was done, continue use of urea lotion, repeat CT 6/17/2022 showed stable disease, discussed with patient/sister/brother to continue current treatment, discussed the need better hypertension control and add losartan, discussed compliance improvement and will try new pillboxes, discussed weight loss and proper nutrition, proceed to next cycle of regorafenib and return in a month but call as needed, Magic mouthwash for thrush, discussed uncontrolled hypertension and she did not take medicine this morning but reports most home BP measurement appears less than 140/90, return in a month. All questions are answered to their satisfaction. They will call for further questions and concerns. ECOG PERFORMANCE STATUS - 1- Restricted in physically strenuous activity but ambulatory and able to carry out work of a light or sedentary nature such as light house work, office work. Pain - 0 - No pain/10. Mild to moderate pain, requiring medication - see MAR     Fatigue - No flowsheet data found.   Distress - No flowsheet data found. Elements of this note have been dictated via voice recognition software. Text and phrases may be limited by the accuracy and autoconversion of the software. The chart has been reviewed, but errors may still be present. Alta Bains M.D.   [address]  Office : [phone]  Fax : [phone]

## 2022-07-23 LAB — VIT B1 BLD-SCNC: 157.2 NMOL/L (ref 66.5–200)

## 2022-07-28 NOTE — PROGRESS NOTES
Received CMM notification stating Stivarga needs PA and a key was provided. PA initiated and awaiting determination.

## 2022-08-15 ENCOUNTER — HOME HEALTH ADMISSION (OUTPATIENT)
Dept: HOME HEALTH SERVICES | Facility: HOME HEALTH | Age: 72
End: 2022-08-15
Payer: MEDICARE

## 2022-08-15 ENCOUNTER — OFFICE VISIT (OUTPATIENT)
Dept: INTERNAL MEDICINE CLINIC | Facility: CLINIC | Age: 72
End: 2022-08-15
Payer: MEDICARE

## 2022-08-15 VITALS
SYSTOLIC BLOOD PRESSURE: 130 MMHG | DIASTOLIC BLOOD PRESSURE: 70 MMHG | BODY MASS INDEX: 20.61 KG/M2 | HEART RATE: 73 BPM | HEIGHT: 62 IN | OXYGEN SATURATION: 98 % | WEIGHT: 112 LBS

## 2022-08-15 DIAGNOSIS — L65.9 HAIR LOSS: ICD-10-CM

## 2022-08-15 DIAGNOSIS — L84 CALLUS OF FOOT: ICD-10-CM

## 2022-08-15 DIAGNOSIS — L89.899: Primary | ICD-10-CM

## 2022-08-15 DIAGNOSIS — R26.81 UNSTEADY GAIT WHEN WALKING: ICD-10-CM

## 2022-08-15 DIAGNOSIS — Z91.81 AT HIGH RISK FOR FALLS: ICD-10-CM

## 2022-08-15 PROCEDURE — G8427 DOCREV CUR MEDS BY ELIG CLIN: HCPCS | Performed by: NURSE PRACTITIONER

## 2022-08-15 PROCEDURE — 99214 OFFICE O/P EST MOD 30 MIN: CPT | Performed by: NURSE PRACTITIONER

## 2022-08-15 PROCEDURE — 3017F COLORECTAL CA SCREEN DOC REV: CPT | Performed by: NURSE PRACTITIONER

## 2022-08-15 PROCEDURE — 1036F TOBACCO NON-USER: CPT | Performed by: NURSE PRACTITIONER

## 2022-08-15 PROCEDURE — 1090F PRES/ABSN URINE INCON ASSESS: CPT | Performed by: NURSE PRACTITIONER

## 2022-08-15 PROCEDURE — G8400 PT W/DXA NO RESULTS DOC: HCPCS | Performed by: NURSE PRACTITIONER

## 2022-08-15 PROCEDURE — G8420 CALC BMI NORM PARAMETERS: HCPCS | Performed by: NURSE PRACTITIONER

## 2022-08-15 PROCEDURE — 1123F ACP DISCUSS/DSCN MKR DOCD: CPT | Performed by: NURSE PRACTITIONER

## 2022-08-15 RX ORDER — MULTIVIT WITH MINERALS/LUTEIN
1000 TABLET ORAL DAILY
COMMUNITY

## 2022-08-15 RX ORDER — CHOLECALCIFEROL (VITAMIN D3) 1250 MCG
1 CAPSULE ORAL DAILY
COMMUNITY

## 2022-08-15 ASSESSMENT — PATIENT HEALTH QUESTIONNAIRE - PHQ9
SUM OF ALL RESPONSES TO PHQ QUESTIONS 1-9: 0
2. FEELING DOWN, DEPRESSED OR HOPELESS: 0
SUM OF ALL RESPONSES TO PHQ QUESTIONS 1-9: 0
SUM OF ALL RESPONSES TO PHQ QUESTIONS 1-9: 0
SUM OF ALL RESPONSES TO PHQ9 QUESTIONS 1 & 2: 0
1. LITTLE INTEREST OR PLEASURE IN DOING THINGS: 0
SUM OF ALL RESPONSES TO PHQ QUESTIONS 1-9: 0

## 2022-08-15 NOTE — PROGRESS NOTES
Shalini Ross (:  1950) is a 80 y.o. female,Established patient, here for evaluation of the following chief complaint(s):  Blister (On both feet causing issues with walking//)         ASSESSMENT/PLAN:  1. Pressure injury of skin of toe, unspecified injury stage, unspecified laterality  -     [facility]  2. Callus of foot  -     [facility]  3. Hair loss  4. Unsteady gait when walking  -     [facility]  5. At high risk for falls  -     [facility]      No follow-ups on file. Refer to podiatry, wear wide toe box shoes - today in tight ballet flats  Hair loss likely due to stivarga  Unsteady and high risk falls, get PT  Use the prescribed magic mouthwash for thrush      Subjective   SUBJECTIVE/OBJECTIVE:  Patient is here for pressure sore and calluses of her feet. She has had abdominal pain and felt tired. She worried about getting a covid test.   She is worried about thrush. She isn't using magic mouthwash. She feels unsteady on her feet and that she may fall        Review of Systems       Objective   Physical Exam  Constitutional:       General: She is not in acute distress. HENT:      Head: Normocephalic. Eyes:      Extraocular Movements: Extraocular movements intact. Pupils: Pupils are equal, round, and reactive to light. Cardiovascular:      Rate and Rhythm: Normal rate and regular rhythm. Pulmonary:      Effort: Pulmonary effort is normal.      Breath sounds: Normal breath sounds. Musculoskeletal:      Cervical back: Neck supple. Skin:     Comments: Bilateral great toes with callus, brown discoloration of callous, tender  Bunions bilateral feet   Neurological:      General: No focal deficit present. Mental Status: She is alert and oriented to person, place, and time. An electronic signature was used to authenticate this note.     --Christine Ayala, APRN - CNP

## 2022-08-18 ENCOUNTER — TELEPHONE (OUTPATIENT)
Dept: INTERNAL MEDICINE CLINIC | Facility: CLINIC | Age: 72
End: 2022-08-18

## 2022-08-18 NOTE — TELEPHONE ENCOUNTER
----- Message from Narendra Crawford sent at 8/17/2022  3:28 PM EDT -----  Subject: Message to Provider    QUESTIONS  Information for Provider? pt requesting a copy of her appointment notes   from her appt on 8/15 with doctor Shazia Anthony  ---------------------------------------------------------------------------  --------------  [id number]  [phone]; Do not leave any message, patient will call back for answer  ---------------------------------------------------------------------------  --------------  SCRIPT ANSWERS  Relationship to Patient?  Self

## 2022-08-22 ENCOUNTER — HOME CARE VISIT (OUTPATIENT)
Dept: SCHEDULING | Facility: HOME HEALTH | Age: 72
End: 2022-08-22
Payer: MEDICARE

## 2022-08-22 VITALS
HEART RATE: 80 BPM | RESPIRATION RATE: 17 BRPM | TEMPERATURE: 97.5 F | SYSTOLIC BLOOD PRESSURE: 138 MMHG | DIASTOLIC BLOOD PRESSURE: 74 MMHG | OXYGEN SATURATION: 98 %

## 2022-08-22 PROCEDURE — G0151 HHCP-SERV OF PT,EA 15 MIN: HCPCS

## 2022-08-22 PROCEDURE — 0221000100 HH NO PAY CLAIM PROCEDURE

## 2022-08-22 PROCEDURE — 1090000001 HH PPS REVENUE CREDIT

## 2022-08-22 PROCEDURE — 400013 HH SOC

## 2022-08-22 PROCEDURE — 1090000002 HH PPS REVENUE DEBIT

## 2022-08-22 ASSESSMENT — ENCOUNTER SYMPTOMS: PAIN LOCATION - PAIN QUALITY: DULL, ACHY

## 2022-08-23 ENCOUNTER — OFFICE VISIT (OUTPATIENT)
Dept: ONCOLOGY | Age: 72
End: 2022-08-23
Payer: MEDICARE

## 2022-08-23 ENCOUNTER — HOSPITAL ENCOUNTER (OUTPATIENT)
Dept: LAB | Age: 72
Discharge: HOME | End: 2022-08-26
Payer: MEDICARE

## 2022-08-23 VITALS
OXYGEN SATURATION: 93 % | TEMPERATURE: 98 F | WEIGHT: 111.5 LBS | HEART RATE: 80 BPM | HEIGHT: 62 IN | SYSTOLIC BLOOD PRESSURE: 148 MMHG | BODY MASS INDEX: 20.52 KG/M2 | DIASTOLIC BLOOD PRESSURE: 77 MMHG | RESPIRATION RATE: 19 BRPM

## 2022-08-23 DIAGNOSIS — L27.1 HAND FOOT SYNDROME: ICD-10-CM

## 2022-08-23 DIAGNOSIS — Z79.899 HIGH RISK MEDICATION USE: ICD-10-CM

## 2022-08-23 DIAGNOSIS — C49.A0 GASTROINTESTINAL STROMAL TUMOR (GIST) (HCC): ICD-10-CM

## 2022-08-23 DIAGNOSIS — C78.7 LIVER METASTASIS (HCC): ICD-10-CM

## 2022-08-23 DIAGNOSIS — C49.A0 GASTROINTESTINAL STROMAL TUMOR (GIST) (HCC): Primary | ICD-10-CM

## 2022-08-23 DIAGNOSIS — B37.0 THRUSH: ICD-10-CM

## 2022-08-23 LAB
ALBUMIN SERPL-MCNC: 3.1 G/DL (ref 3.2–4.6)
ALBUMIN/GLOB SERPL: 0.8 {RATIO} (ref 1.2–3.5)
ALP SERPL-CCNC: 65 U/L (ref 50–136)
ALT SERPL-CCNC: 18 U/L (ref 12–65)
ANION GAP SERPL CALC-SCNC: 7 MMOL/L (ref 7–16)
AST SERPL-CCNC: 12 U/L (ref 15–37)
BASOPHILS # BLD: 0 K/UL (ref 0–0.2)
BASOPHILS NFR BLD: 1 % (ref 0–2)
BILIRUB SERPL-MCNC: 0.7 MG/DL (ref 0.2–1.1)
BUN SERPL-MCNC: 21 MG/DL (ref 8–23)
CALCIUM SERPL-MCNC: 9.3 MG/DL (ref 8.3–10.4)
CHLORIDE SERPL-SCNC: 105 MMOL/L (ref 98–107)
CO2 SERPL-SCNC: 28 MMOL/L (ref 21–32)
CREAT SERPL-MCNC: 0.7 MG/DL (ref 0.6–1)
DIFFERENTIAL METHOD BLD: ABNORMAL
EOSINOPHIL # BLD: 0.1 K/UL (ref 0–0.8)
EOSINOPHIL NFR BLD: 1 % (ref 0.5–7.8)
ERYTHROCYTE [DISTWIDTH] IN BLOOD BY AUTOMATED COUNT: 18.9 % (ref 11.9–14.6)
GLOBULIN SER CALC-MCNC: 4.1 G/DL (ref 2.3–3.5)
GLUCOSE SERPL-MCNC: 110 MG/DL (ref 65–100)
HCT VFR BLD AUTO: 37.6 % (ref 35.8–46.3)
HGB BLD-MCNC: 11.7 G/DL (ref 11.7–15.4)
IMM GRANULOCYTES # BLD AUTO: 0 K/UL (ref 0–0.5)
IMM GRANULOCYTES NFR BLD AUTO: 0 % (ref 0–5)
LYMPHOCYTES # BLD: 1.7 K/UL (ref 0.5–4.6)
LYMPHOCYTES NFR BLD: 21 % (ref 13–44)
MCH RBC QN AUTO: 26.7 PG (ref 26.1–32.9)
MCHC RBC AUTO-ENTMCNC: 31.1 G/DL (ref 31.4–35)
MCV RBC AUTO: 85.8 FL (ref 79.6–97.8)
MONOCYTES # BLD: 1 K/UL (ref 0.1–1.3)
MONOCYTES NFR BLD: 12 % (ref 4–12)
NEUTS SEG # BLD: 5.2 K/UL (ref 1.7–8.2)
NEUTS SEG NFR BLD: 65 % (ref 43–78)
NRBC # BLD: 0 K/UL (ref 0–0.2)
PLATELET # BLD AUTO: 312 K/UL (ref 150–450)
PMV BLD AUTO: 8.4 FL (ref 9.4–12.3)
POTASSIUM SERPL-SCNC: 3.8 MMOL/L (ref 3.5–5.1)
PROT SERPL-MCNC: 7.2 G/DL (ref 6.3–8.2)
RBC # BLD AUTO: 4.38 M/UL (ref 4.05–5.2)
SODIUM SERPL-SCNC: 140 MMOL/L (ref 136–145)
WBC # BLD AUTO: 8.1 K/UL (ref 4.3–11.1)

## 2022-08-23 PROCEDURE — G8427 DOCREV CUR MEDS BY ELIG CLIN: HCPCS | Performed by: INTERNAL MEDICINE

## 2022-08-23 PROCEDURE — 36415 COLL VENOUS BLD VENIPUNCTURE: CPT

## 2022-08-23 PROCEDURE — 85025 COMPLETE CBC W/AUTO DIFF WBC: CPT

## 2022-08-23 PROCEDURE — 3017F COLORECTAL CA SCREEN DOC REV: CPT | Performed by: INTERNAL MEDICINE

## 2022-08-23 PROCEDURE — 1036F TOBACCO NON-USER: CPT | Performed by: INTERNAL MEDICINE

## 2022-08-23 PROCEDURE — 1090000002 HH PPS REVENUE DEBIT

## 2022-08-23 PROCEDURE — 1090000001 HH PPS REVENUE CREDIT

## 2022-08-23 PROCEDURE — 99215 OFFICE O/P EST HI 40 MIN: CPT | Performed by: INTERNAL MEDICINE

## 2022-08-23 PROCEDURE — G8400 PT W/DXA NO RESULTS DOC: HCPCS | Performed by: INTERNAL MEDICINE

## 2022-08-23 PROCEDURE — 1123F ACP DISCUSS/DSCN MKR DOCD: CPT | Performed by: INTERNAL MEDICINE

## 2022-08-23 PROCEDURE — G8420 CALC BMI NORM PARAMETERS: HCPCS | Performed by: INTERNAL MEDICINE

## 2022-08-23 PROCEDURE — 80053 COMPREHEN METABOLIC PANEL: CPT

## 2022-08-23 PROCEDURE — 1090F PRES/ABSN URINE INCON ASSESS: CPT | Performed by: INTERNAL MEDICINE

## 2022-08-23 ASSESSMENT — PATIENT HEALTH QUESTIONNAIRE - PHQ9
SUM OF ALL RESPONSES TO PHQ QUESTIONS 1-9: 2
SUM OF ALL RESPONSES TO PHQ QUESTIONS 1-9: 2
2. FEELING DOWN, DEPRESSED OR HOPELESS: 1
SUM OF ALL RESPONSES TO PHQ QUESTIONS 1-9: 2
SUM OF ALL RESPONSES TO PHQ QUESTIONS 1-9: 2
1. LITTLE INTEREST OR PLEASURE IN DOING THINGS: 1
SUM OF ALL RESPONSES TO PHQ9 QUESTIONS 1 & 2: 2

## 2022-08-23 NOTE — PROGRESS NOTES
[facility] Hematology & Oncology: Office Visit Progress Note    Chief Complaint:     GIST      History of Present Illness:   Reason for Referral: Gastrointestinal stromal  tumor (GIST); Secondary malignant neoplasm of liver and intrahepatic bile duct; Iron deficiency anemia due to chronic blood loss       Referring Provider: Trell Gomes NP       Primary Care Provider: Trell Gomes NP       Family History of Cancer/Hematologic Disorders: Family history is significant for lung cancer, throat/neck cancer, and acute leukemia. Presenting Symptoms: Fatigue, constipation, nausea, and abdominal distension       Ms. Oumou Castillo is a 80 y.o. white female with a history of anemia with history of blood transfusion, HTN, fibroid removal, and myomectomy. Patient  was admitted at [address] in March of 2020 for severe anemia and GI bleed. Work-up included CT scan of the abdomen and pelvis on 3/16/2020 which identified a large soft tissue mass in the posterior margin of the greater curvature measuring  17.9 cm with hypodensity in the left hepatic lobe reflective of metastatic lesion and soft tissue lesion adjacent to the right posterior hepatic lobe suggestive of metastatic implant. On 3/18/20 she underwent upper GI endoscopy with biopsy of the gastric  body mass with pathology revealing a gastrointestinal stromal tumor. Immunohistochemistry was strongly positive for  confirming GIST. Flow cytometric analysis showed mild granulocytic left shift and mild monocytosis; and elevated T cell CD4/CD8 ratio,  with no definitive immunophenotypic evidence of lymphoproliferative disease. During hospitalization, CEA was 0.8 and HGB dropped to 6.0. She received several units of PRBCs. She also received IV iron infusions, as oral iron had been ineffective. She was  planned for a colonoscopy; however, she declined the procedure.         Upon discharge, she was seen outpatient by Oncologist, Dr. Bradley Paulino, [facility] She started treatment for GIST with Imatinib on 5/24/2020. CT of the abdomen and pelvis on 9/21/20 when compared to CT of 3/16/20 showed  a significant initial response to treatment with the LUQ tumor measuring 15.5 x 12.6 x 9.9 cm in March of 2020 and measuring 10.3 x 9.9. 3.5 cm in September of 2020. CT of the abdomen and pelvis on 2/17/21 and 8/27/21 showed a stable appearance of gastric  neoplasm with no progression of disease. Patient continued to tolerate Imatinib well. On 11/8/21, CT A/P imaging showed signs of disease progression. In view of progression, treatment was changed to Sunitinib with does reduction to 37.5 mg daily, 4 weeks on, 2 weeks off. ECHO on 12/9/21 showed an EF of 60% and patient was subsequently  started on Sutent. Unfortunately, she experienced severe body aches, peeling skin and felt miserable, and Sutent was discontinued. Patient was given information about Stivarga, and she initially declined to start this. However, on 2/21/22, CT of the  abdomen and pelvis showed multiple large partially cystic masses throughout the abdomen and pelvis which had significantly progressed compared to imaging on 11/8/21. Patient was started on Stivarga 40 mg on 3/10/22. Labs drawn on 2/23/22 showed mild worsening of anemia. Patient reported intermittent bloody stools, but she declined GI referral. Iron panel showed iron deficiency and IV Infed x 4 doses was ordered. Patient received the first dose on 3/30/22. No documentation  was sent to indicate that she received the remaining 3 doses. Most recent labs drawn on 3/31/22 were significant for HGB 11.4, HCT 34.5, RDW 18.6, , and monocytes 10.1. Patient was most recently seen by Dr. Shannan Cortes on 3/30/22.  Patient has since relocated from [place] to the [facility], and now presents  to [facility], per referral from primary care, to establish oncology care for continued management of gastrointestinal stromal tumor, secondary malignant neoplasm of liver and intrahepatic  bile duct, and iron deficiency anemia due to chronic blood loss. She continues on Stivarga 40 mg.        SURGICAL PATHOLOGY 3/18/2020                 FLOW CYTOMETRY ANALYSIS 3/18/2020            CT ABDOMEN AND PELVIS W CONTRAST 11/8/21                               CT CHEST WITH CONTRAST 11/8/21                 CT OF THE ABDOMEN AND PELVIS WITHOUT IV CONTRAST 2/21/22                      CMP 1/6/22            CBC 3/9/22                 CBC 3/31/22                CT 6/17/22:          Review of Systems:      Constitutional  Denies fever or chills. Fatigue, weight loss, anorexia        HEENT  Denies trauma, hearing loss, ear pain, nosebleeds, sore throat, neck pain and ear discharge. Change in vision         Skin  Rash, itching, blister     Lungs  Denies shortness of breath, cough, sputum production or hemoptysis. Cardiovascular  Denies palpitations, orthopnea, claudication and leg swelling. Chest pain/pressure     Gastrointestinal  Denies nausea, vomiting. Denies abdominal pain. Constipation, dark black/bloody stools       Denies dysuria, or hesitancy of urination   Frequency      Neuro  Denies ataxia. Denies dizziness, tremors, speech change, focal weakness. Neuropathy, headaches     Hematology  Denies nasal/gum bleeding, denies easy bruise     Endo  Denies heat/cold intolerance, denies diabetes. MSK  Denies back pain, swollen legs, and falls. Joint pain     Psychiatric/Behavioral  Denies depression and substance abuse. The patient is not nervous/anxious.               No Known Allergies  Past Medical History:   Diagnosis Date    Anemia     GIST (gastrointestinal stromal tumor), malignant ([address]     Hypertension     Stomach cancer ([address]     GIST     Past Surgical History:   Procedure Laterality Date    MYOMECTOMY      OTHER SURGICAL HISTORY      removal of fibroid    UPPER GASTROINTESTINAL ENDOSCOPY UPPER GASTROINTESTINAL ENDOSCOPY       Family History   Problem Relation Age of Onset    Lung Cancer Mother     Diabetes Mother     No Known Problems Brother      Social History     Socioeconomic History    Marital status: Single     Spouse name: Not on file    Number of children: Not on file    Years of education: Not on file    Highest education level: Not on file   Occupational History    Not on file   Tobacco Use    Smoking status: Never    Smokeless tobacco: Never   Substance and Sexual Activity    Alcohol use: Not Currently    Drug use: Not Currently    Sexual activity: Not on file   Other Topics Concern    Not on file   Social History Narrative    Not on file     Social Determinants of Health     Financial Resource Strain: Not on file   Food Insecurity: Not on file   Transportation Needs: Not on file   Physical Activity: Not on file   Stress: Not on file   Social Connections: Not on file   Intimate Partner Violence: Not on file   Housing Stability: Not on file     Current Outpatient Medications   Medication Sig Dispense Refill    Cholecalciferol (VITAMIN D3) 1.25 MG (68539 UT) CAPS Take 1 capsule by mouth daily      Ascorbic Acid (VITAMIN C) 1000 MG tablet Take 1,000 mg by mouth in the morning. Magic Mouthwash (MIRACLE MOUTHWASH) Swish and spit 5 mLs 4 times daily as needed for Irritation 480 mL 1    STIVARGA 40 MG tablet Take 3 tablets by mouth before bedtime. 21 day cycle-7 days off . 63 tablet 3    acetaminophen (TYLENOL) 500 MG tablet Take 500 mg by mouth every 4 hours as needed for Pain      losartan (COZAAR) 100 MG tablet Take 1 tablet by mouth daily 30 tablet 5    metoprolol succinate (TOPROL XL) 25 MG extended release tablet Take 1 tablet by mouth daily 30 tablet 5    Ferrous Sulfate (IRON) 90 (18 Fe) MG TABS Take 18 mg by mouth daily      b complex vitamins capsule Take 1 capsule by mouth daily With B1        No current facility-administered medications for this visit.        OBJECTIVE:  BP (!) 148/77 (Site: Left Upper Arm, Position: Sitting, Cuff Size: Child)   Pulse 80   Temp 98 °F (36.7 °C) (Oral)   Resp 19   Ht 5' 2\" (1.575 m)   Wt 111 lb 8 oz (50.6 kg)   SpO2 93%   BMI 20.39 kg/m²     Physical Exam:  Constitutional: Oriented to person, place, and time. Thin. Well-nourished. HEENT: Normocephalic and atraumatic. Oropharynx is clear and moist.   Conjunctivae and EOM are normal. Pupils are equal, round, and reactive to light. No scleral icterus. Neck supple. No JVD present. No tracheal deviation present. No thyromegaly present. Lymph node No palpable submandibular, cervical, supraclavicular, axillary and inguinal lymph nodes. Skin  calluses and blisters on bottom of both feet. Warm and dry. No bruising and no rash noted. No erythema. No pallor. Respiratory Effort normal and breath sounds normal.  No respiratory distress. No wheezes. No rales. No tenderness. CVS Normal rate, regular rhythm and normal heart sounds. Exam reveals no gallop, no friction and no rub. No murmur heard. Abdomen Soft. Bowel sounds are normal. Exhibits no distension. There is no tenderness. There is no rebound and no guarding. Neuro Normal reflexes. No cranial nerve deficit. Exhibits normal muscle tone, 5 of 5 strength of all extremities. MSK Normal range of motion in general.  No edema and no tenderness.    Psych Normal mood, affect, behavior, judgment and thought content      Labs:  Recent Results (from the past 24 hour(s))   CBC with Auto Differential    Collection Time: 08/23/22  1:13 PM   Result Value Ref Range    WBC 8.1 4.3 - 11.1 K/uL    RBC 4.38 4.05 - 5.2 M/uL    Hemoglobin 11.7 11.7 - 15.4 g/dL    Hematocrit 37.6 35.8 - 46.3 %    MCV 85.8 79.6 - 97.8 FL    MCH 26.7 26.1 - 32.9 PG    MCHC 31.1 (L) 31.4 - 35.0 g/dL    RDW 18.9 (H) 11.9 - 14.6 %    Platelets 187 859 - 600 K/uL    MPV 8.4 (L) 9.4 - 12.3 FL    nRBC 0.00 0.0 - 0.2 K/uL    Seg Neutrophils 65 43 - 78 %    Lymphocytes 21 13 - 44 %    Monocytes 12 4.0 - 12.0 %    Eosinophils % 1 0.5 - 7.8 %    Basophils 1 0.0 - 2.0 %    Immature Granulocytes 0 0.0 - 5.0 %    Segs Absolute 5.2 1.7 - 8.2 K/UL    Absolute Lymph # 1.7 0.5 - 4.6 K/UL    Absolute Mono # 1.0 0.1 - 1.3 K/UL    Absolute Eos # 0.1 0.0 - 0.8 K/UL    Basophils Absolute 0.0 0.0 - 0.2 K/UL    Absolute Immature Granulocyte 0.0 0.0 - 0.5 K/UL    Differential Type AUTOMATED     Comprehensive Metabolic Panel    Collection Time: 08/23/22  1:13 PM   Result Value Ref Range    Sodium 140 136 - 145 mmol/L    Potassium 3.8 3.5 - 5.1 mmol/L    Chloride 105 98 - 107 mmol/L    CO2 28 21 - 32 mmol/L    Anion Gap 7 7 - 16 mmol/L    Glucose 110 (H) 65 - 100 mg/dL    BUN 21 8 - 23 MG/DL    Creatinine 0.70 0.6 - 1.0 MG/DL    GFR African American >60 >60 ml/min/1.73m2    GFR Non- >60 >60 ml/min/1.73m2    Calcium 9.3 8.3 - 10.4 MG/DL    Total Bilirubin 0.7 0.2 - 1.1 MG/DL    ALT 18 12 - 65 U/L    AST 12 (L) 15 - 37 U/L    Alk Phosphatase 65 50 - 136 U/L    Total Protein 7.2 6.3 - 8.2 g/dL    Albumin 3.1 (L) 3.2 - 4.6 g/dL    Globulin 4.1 (H) 2.3 - 3.5 g/dL    Albumin/Globulin Ratio 0.8 (L) 1.2 - 3.5         Imaging:  No results found for this or any previous visit. ASSESSMENT/PLAN:   Diagnosis Orders   1. Gastrointestinal stromal tumor (GIST) ([facility]  CT CHEST ABDOMEN PELVIS W CONTRAST    CBC with Auto Differential    Comprehensive Metabolic Panel      2. Liver metastasis ([facility]  CT CHEST ABDOMEN PELVIS W CONTRAST      3. High risk medication use        4. Thrush        5. Hand foot syndrome              80 y.o. F consulted for metastatic GIST presented to [facility] with sister on 5/3/2022.   She was diagnosed of large stomach GIST  and the liver metastasis in 3/2020, started [address] in 5/2020 with good response until 11/2021, did not try to increase dose of imatinib and changed to sunitinib which was poorly tolerated and stopped quickly, follow-up imaging showed disease progression  and started as she complained of radiation exposure, I explained and she agreed to arrange, continue Magic mouthwash as needed for thrush, continue urea lotion for hand-foot syndrome, continue regorafenib and return in the month but call as needed. All questions are answered to their satisfaction. They will call for further questions and concerns. ECOG PERFORMANCE STATUS - 1- Restricted in physically strenuous activity but ambulatory and able to carry out work of a light or sedentary nature such as light house work, office work. Pain - 3/10. Mild to moderate pain, requiring medication - see MAR     Fatigue - No flowsheet data found. Distress - No flowsheet data found. Elements of this note have been dictated via voice recognition software. Text and phrases may be limited by the accuracy and autoconversion of the software. The chart has been reviewed, but errors may still be present. Ankur Charlton M.D.   [address], [address] [place] [address]  Office : [phone]  Fax : [phone]

## 2022-08-23 NOTE — PATIENT INSTRUCTIONS
Patient Instructions from Today's Visit    Reason for Visit:  Follow-up visit for GIST     Diagnosis Information:  [email or web address] net/about-us/asco-answers-patient-education-materials/mhzd-fjjojel-bvqd-sheets    Plan:  -We will arrange another CT scan before your next visit.   -Continue taking Stivarga. Hand-foot syndrome  Caused by some chemotherapy (IV and pill)  Palms of hands and bottoms of feet become red, tender and possible peeling/blistering of skin. Looks like a sunburn. Heat and friction to hands and feet can increase symptoms  Prevention measures  reduce friction and heat exposure to hands and feet  avoid exposure to hot water (washing dishes, long showers, baths, etc.)  avoid jogging, aerobics, jumping, and long days of walking  cold may provide relief from pain - ice packs or frozen bag of veggies to hands and feet. Alternate on and off for about 15 minutes at a time. keep hands and feet very well moisturized, several times a day. Good lotions      Aveeno, lubriderm, udder cream, bag balm, etc.  Lotion with urea in it. You need to call when any symptoms start - redness, tenderness, or peeling. Mouth care  Check with your doctor before having any dental work done. Chemo can cause you to develop mouth sores or tenderness  Avoid spicy, acidic, salty, crunchy foods or beverages. Eat soft foods. Use soft toothbrush and brush after every meal   Use alcohol free mouthwash and toothpaste which can be irritating to mouth  Biotene mouthwash is a good option                Warm salt water and/or baking soda rinses several times a day, especially after eating to keep mouth clean. 1/2 tsp salt + 1/2 tsp baking soda in 8 oz of water. If mouth sores continue call, there are prescription mouth washes we can call in to your pharmacy  Dry mouth - over the counter xylimelts or mouth kote  Drink plenty of fluids to keep mouth moist.  Hard candies can help also.     Oral thrush is common also when receiving chemo. This is a white coating of the mouth and tongue. Call if you have these symptoms because this will require a prescription to clear up.     Follow Up:  -1 month    Recent Lab Results:  Hospital Outpatient Visit on 08/23/2022   Component Date Value Ref Range Status    WBC 08/23/2022 8.1  4.3 - 11.1 K/uL Final    RBC 08/23/2022 4.38  4.05 - 5.2 M/uL Final    Hemoglobin 08/23/2022 11.7  11.7 - 15.4 g/dL Final    Hematocrit 08/23/2022 37.6  35.8 - 46.3 % Final    MCV 08/23/2022 85.8  79.6 - 97.8 FL Final    MCH 08/23/2022 26.7  26.1 - 32.9 PG Final    MCHC 08/23/2022 31.1 (A) 31.4 - 35.0 g/dL Final    RDW 08/23/2022 18.9 (A) 11.9 - 14.6 % Final    Platelets 49/89/6012 312  150 - 450 K/uL Final    MPV 08/23/2022 8.4 (A) 9.4 - 12.3 FL Final    nRBC 08/23/2022 0.00  0.0 - 0.2 K/uL Final    **Note: Absolute NRBC parameter is now reported with Hemogram**    Seg Neutrophils 08/23/2022 65  43 - 78 % Final    Lymphocytes 08/23/2022 21  13 - 44 % Final    Monocytes 08/23/2022 12  4.0 - 12.0 % Final    Eosinophils % 08/23/2022 1  0.5 - 7.8 % Final    Basophils 08/23/2022 1  0.0 - 2.0 % Final    Immature Granulocytes 08/23/2022 0  0.0 - 5.0 % Final    Segs Absolute 08/23/2022 5.2  1.7 - 8.2 K/UL Final    Absolute Lymph # 08/23/2022 1.7  0.5 - 4.6 K/UL Final    Absolute Mono # 08/23/2022 1.0  0.1 - 1.3 K/UL Final    Absolute Eos # 08/23/2022 0.1  0.0 - 0.8 K/UL Final    Basophils Absolute 08/23/2022 0.0  0.0 - 0.2 K/UL Final    Absolute Immature Granulocyte 08/23/2022 0.0  0.0 - 0.5 K/UL Final    Differential Type 08/23/2022 AUTOMATED    Final    Sodium 08/23/2022 140  136 - 145 mmol/L Final    Potassium 08/23/2022 3.8  3.5 - 5.1 mmol/L Final    Chloride 08/23/2022 105  98 - 107 mmol/L Final    CO2 08/23/2022 28  21 - 32 mmol/L Final    Anion Gap 08/23/2022 7  7 - 16 mmol/L Final    Glucose 08/23/2022 110 (A) 65 - 100 mg/dL Final    BUN 08/23/2022 21  8 - 23 MG/DL Final    Creatinine 08/23/2022 0.70  0.6 - 1.0 MG/DL Final    GFR  08/23/2022 >60  >60 ml/min/1.73m2 Final    GFR Non- 08/23/2022 >60  >60 ml/min/1.73m2 Final    Comment:      Estimated GFR is calculated using the Modification of Diet in Renal Disease (MDRD) Study equation, reported for both  Americans (GFRAA) and non- Americans (GFRNA), and normalized to 1.73m2 body surface area. The physician must decide which value applies to the patient. The MDRD study equation should only be used in individuals age 25 or older. It has not been validated for the following: pregnant women, patients with serious comorbid conditions,or on certain medications, or persons with extremes of body size, muscle mass, or nutritional status. Calcium 08/23/2022 9.3  8.3 - 10.4 MG/DL Final    Total Bilirubin 08/23/2022 0.7  0.2 - 1.1 MG/DL Final    ALT 08/23/2022 18  12 - 65 U/L Final    AST 08/23/2022 12 (A) 15 - 37 U/L Final    Alk Phosphatase 08/23/2022 65  50 - 136 U/L Final    Total Protein 08/23/2022 7.2  6.3 - 8.2 g/dL Final    Albumin 08/23/2022 3.1 (A) 3.2 - 4.6 g/dL Final    Globulin 08/23/2022 4.1 (A) 2.3 - 3.5 g/dL Final    Albumin/Globulin Ratio 08/23/2022 0.8 (A) 1.2 - 3.5   Final       Treatment Summary has been discussed and given to patient: N/A      -------------------------------------------------------------------------------------------------------------------  Please call our office at [phone] if you have any  of the following symptoms:   Fever of 100.5 or greater  Chills  Shortness of breath  Swelling or pain in one leg    After office hours an answering service is available and will contact a provider for emergencies or if you are experiencing any of the above symptoms. Patient did not express an interest in My Chart. My Chart log in information explained on the after visit summary printout at the [facility].     Stefano Mcnamara RN

## 2022-08-23 NOTE — PROGRESS NOTES
Oral Chemotherapy Adherence:     Current Regimen:  Drug Name: Stivarga  Dose: 120 mg  Frequency: Daily 21 days on/7 days off    Barriers to care identified including (financial, physical, psychosocial) : No    Missed doses reported: Yes    Patient verbalizes understanding of what to do in the event of a missed dose:  Yes    Adverse reactions/toxicities reported:None

## 2022-08-24 PROCEDURE — 1090000001 HH PPS REVENUE CREDIT

## 2022-08-24 PROCEDURE — 1090000002 HH PPS REVENUE DEBIT

## 2022-08-25 ENCOUNTER — HOME CARE VISIT (OUTPATIENT)
Dept: SCHEDULING | Facility: HOME HEALTH | Age: 72
End: 2022-08-25
Payer: MEDICARE

## 2022-08-25 VITALS
OXYGEN SATURATION: 98 % | HEART RATE: 84 BPM | SYSTOLIC BLOOD PRESSURE: 110 MMHG | DIASTOLIC BLOOD PRESSURE: 60 MMHG | RESPIRATION RATE: 15 BRPM | TEMPERATURE: 98.3 F

## 2022-08-25 PROCEDURE — 1090000002 HH PPS REVENUE DEBIT

## 2022-08-25 PROCEDURE — 1090000001 HH PPS REVENUE CREDIT

## 2022-08-25 PROCEDURE — G0157 HHC PT ASSISTANT EA 15: HCPCS

## 2022-08-26 PROCEDURE — 1090000002 HH PPS REVENUE DEBIT

## 2022-08-26 PROCEDURE — 1090000001 HH PPS REVENUE CREDIT

## 2022-08-27 PROCEDURE — 1090000001 HH PPS REVENUE CREDIT

## 2022-08-27 PROCEDURE — 1090000002 HH PPS REVENUE DEBIT

## 2022-08-28 PROCEDURE — 1090000001 HH PPS REVENUE CREDIT

## 2022-08-28 PROCEDURE — 1090000002 HH PPS REVENUE DEBIT

## 2022-08-29 ENCOUNTER — HOME CARE VISIT (OUTPATIENT)
Dept: SCHEDULING | Facility: HOME HEALTH | Age: 72
End: 2022-08-29
Payer: MEDICARE

## 2022-08-29 VITALS
SYSTOLIC BLOOD PRESSURE: 132 MMHG | RESPIRATION RATE: 17 BRPM | OXYGEN SATURATION: 99 % | TEMPERATURE: 97.1 F | DIASTOLIC BLOOD PRESSURE: 85 MMHG | HEART RATE: 96 BPM

## 2022-08-29 PROCEDURE — G0157 HHC PT ASSISTANT EA 15: HCPCS

## 2022-08-29 PROCEDURE — 1090000001 HH PPS REVENUE CREDIT

## 2022-08-29 PROCEDURE — 1090000002 HH PPS REVENUE DEBIT

## 2022-08-30 PROCEDURE — 1090000002 HH PPS REVENUE DEBIT

## 2022-08-30 PROCEDURE — 1090000001 HH PPS REVENUE CREDIT

## 2022-08-31 PROCEDURE — 1090000002 HH PPS REVENUE DEBIT

## 2022-08-31 PROCEDURE — 1090000001 HH PPS REVENUE CREDIT

## 2022-09-01 PROCEDURE — 1090000001 HH PPS REVENUE CREDIT

## 2022-09-01 PROCEDURE — 1090000002 HH PPS REVENUE DEBIT

## 2022-09-02 ENCOUNTER — HOME CARE VISIT (OUTPATIENT)
Dept: SCHEDULING | Facility: HOME HEALTH | Age: 72
End: 2022-09-02
Payer: MEDICARE

## 2022-09-02 VITALS
TEMPERATURE: 98.1 F | RESPIRATION RATE: 15 BRPM | DIASTOLIC BLOOD PRESSURE: 68 MMHG | HEART RATE: 84 BPM | OXYGEN SATURATION: 98 % | SYSTOLIC BLOOD PRESSURE: 100 MMHG

## 2022-09-02 PROCEDURE — 1090000002 HH PPS REVENUE DEBIT

## 2022-09-02 PROCEDURE — G0157 HHC PT ASSISTANT EA 15: HCPCS

## 2022-09-02 PROCEDURE — 1090000001 HH PPS REVENUE CREDIT

## 2022-09-02 ASSESSMENT — ENCOUNTER SYMPTOMS: PAIN LOCATION - PAIN QUALITY: ACHE

## 2022-09-03 PROCEDURE — 1090000002 HH PPS REVENUE DEBIT

## 2022-09-03 PROCEDURE — 1090000001 HH PPS REVENUE CREDIT

## 2022-09-04 PROCEDURE — 1090000001 HH PPS REVENUE CREDIT

## 2022-09-04 PROCEDURE — 1090000002 HH PPS REVENUE DEBIT

## 2022-09-05 ENCOUNTER — HOME CARE VISIT (OUTPATIENT)
Dept: SCHEDULING | Facility: HOME HEALTH | Age: 72
End: 2022-09-05
Payer: MEDICARE

## 2022-09-05 VITALS
HEART RATE: 87 BPM | RESPIRATION RATE: 16 BRPM | TEMPERATURE: 97.3 F | SYSTOLIC BLOOD PRESSURE: 101 MMHG | DIASTOLIC BLOOD PRESSURE: 63 MMHG | OXYGEN SATURATION: 99 %

## 2022-09-05 PROCEDURE — 1090000002 HH PPS REVENUE DEBIT

## 2022-09-05 PROCEDURE — 1090000001 HH PPS REVENUE CREDIT

## 2022-09-05 PROCEDURE — G0151 HHCP-SERV OF PT,EA 15 MIN: HCPCS

## 2022-09-05 ASSESSMENT — ENCOUNTER SYMPTOMS: PAIN LOCATION - PAIN QUALITY: SORNESS

## 2022-09-06 ENCOUNTER — HOME CARE VISIT (OUTPATIENT)
Dept: HOME HEALTH SERVICES | Facility: HOME HEALTH | Age: 72
End: 2022-09-06
Payer: MEDICARE

## 2022-09-06 PROCEDURE — 1090000002 HH PPS REVENUE DEBIT

## 2022-09-06 PROCEDURE — 1090000001 HH PPS REVENUE CREDIT

## 2022-09-07 PROCEDURE — 1090000002 HH PPS REVENUE DEBIT

## 2022-09-07 PROCEDURE — 1090000001 HH PPS REVENUE CREDIT

## 2022-09-08 PROCEDURE — 1090000002 HH PPS REVENUE DEBIT

## 2022-09-08 PROCEDURE — 1090000001 HH PPS REVENUE CREDIT

## 2022-09-09 ENCOUNTER — HOME CARE VISIT (OUTPATIENT)
Dept: SCHEDULING | Facility: HOME HEALTH | Age: 72
End: 2022-09-09
Payer: MEDICARE

## 2022-09-09 VITALS
SYSTOLIC BLOOD PRESSURE: 136 MMHG | RESPIRATION RATE: 17 BRPM | DIASTOLIC BLOOD PRESSURE: 62 MMHG | OXYGEN SATURATION: 98 % | TEMPERATURE: 98.1 F | HEART RATE: 92 BPM

## 2022-09-09 PROCEDURE — G0151 HHCP-SERV OF PT,EA 15 MIN: HCPCS

## 2022-09-09 PROCEDURE — 1090000002 HH PPS REVENUE DEBIT

## 2022-09-09 PROCEDURE — 1090000001 HH PPS REVENUE CREDIT

## 2022-09-10 PROCEDURE — 1090000002 HH PPS REVENUE DEBIT

## 2022-09-10 PROCEDURE — 1090000001 HH PPS REVENUE CREDIT

## 2022-09-11 PROCEDURE — 1090000002 HH PPS REVENUE DEBIT

## 2022-09-11 PROCEDURE — 1090000001 HH PPS REVENUE CREDIT

## 2022-09-12 PROCEDURE — 1090000001 HH PPS REVENUE CREDIT

## 2022-09-12 PROCEDURE — 1090000002 HH PPS REVENUE DEBIT

## 2022-09-13 PROCEDURE — 1090000002 HH PPS REVENUE DEBIT

## 2022-09-13 PROCEDURE — 1090000001 HH PPS REVENUE CREDIT

## 2022-09-14 PROCEDURE — 1090000002 HH PPS REVENUE DEBIT

## 2022-09-14 PROCEDURE — 1090000001 HH PPS REVENUE CREDIT

## 2022-09-15 ENCOUNTER — HOME CARE VISIT (OUTPATIENT)
Dept: SCHEDULING | Facility: HOME HEALTH | Age: 72
End: 2022-09-15
Payer: MEDICARE

## 2022-09-15 VITALS
HEART RATE: 72 BPM | DIASTOLIC BLOOD PRESSURE: 74 MMHG | SYSTOLIC BLOOD PRESSURE: 126 MMHG | TEMPERATURE: 97.9 F | OXYGEN SATURATION: 98 % | RESPIRATION RATE: 17 BRPM

## 2022-09-15 PROCEDURE — G0151 HHCP-SERV OF PT,EA 15 MIN: HCPCS

## 2022-09-15 PROCEDURE — 1090000001 HH PPS REVENUE CREDIT

## 2022-09-15 PROCEDURE — 1090000002 HH PPS REVENUE DEBIT

## 2022-09-15 ASSESSMENT — ENCOUNTER SYMPTOMS: PAIN LOCATION - PAIN QUALITY: PRESSURE

## 2022-09-19 ENCOUNTER — HOSPITAL ENCOUNTER (OUTPATIENT)
Dept: CT IMAGING | Age: 72
Discharge: HOME | End: 2022-09-22
Payer: MEDICARE

## 2022-09-19 DIAGNOSIS — C78.7 LIVER METASTASIS (HCC): ICD-10-CM

## 2022-09-19 DIAGNOSIS — C49.A0 GASTROINTESTINAL STROMAL TUMOR (GIST) (HCC): ICD-10-CM

## 2022-09-19 PROCEDURE — 6360000004 HC RX CONTRAST MEDICATION: Performed by: INTERNAL MEDICINE

## 2022-09-19 PROCEDURE — 74177 CT ABD & PELVIS W/CONTRAST: CPT

## 2022-09-19 RX ORDER — SODIUM CHLORIDE 0.9 % (FLUSH) 0.9 %
10 SYRINGE (ML) INJECTION
Status: DISCONTINUED | OUTPATIENT
Start: 2022-09-19 | End: 2022-09-23 | Stop reason: HOSPADM

## 2022-09-19 RX ORDER — 0.9 % SODIUM CHLORIDE 0.9 %
100 INTRAVENOUS SOLUTION INTRAVENOUS
Status: DISCONTINUED | OUTPATIENT
Start: 2022-09-19 | End: 2022-09-23 | Stop reason: HOSPADM

## 2022-09-19 RX ADMIN — IOPAMIDOL 100 ML: 755 INJECTION, SOLUTION INTRAVENOUS at 11:43

## 2022-09-19 RX ADMIN — DIATRIZOATE MEGLUMINE AND DIATRIZOATE SODIUM 15 ML: 660; 100 LIQUID ORAL; RECTAL at 11:43

## 2022-09-27 ENCOUNTER — OFFICE VISIT (OUTPATIENT)
Dept: ONCOLOGY | Age: 72
End: 2022-09-27
Payer: MEDICARE

## 2022-09-27 ENCOUNTER — HOSPITAL ENCOUNTER (OUTPATIENT)
Dept: LAB | Age: 72
Discharge: HOME | End: 2022-09-30
Payer: MEDICARE

## 2022-09-27 VITALS
HEART RATE: 96 BPM | TEMPERATURE: 98.7 F | SYSTOLIC BLOOD PRESSURE: 134 MMHG | HEIGHT: 62 IN | WEIGHT: 131.2 LBS | DIASTOLIC BLOOD PRESSURE: 73 MMHG | BODY MASS INDEX: 24.14 KG/M2 | OXYGEN SATURATION: 98 % | RESPIRATION RATE: 14 BRPM

## 2022-09-27 DIAGNOSIS — C78.7 LIVER METASTASIS (HCC): ICD-10-CM

## 2022-09-27 DIAGNOSIS — Z79.899 HIGH RISK MEDICATION USE: ICD-10-CM

## 2022-09-27 DIAGNOSIS — C49.A0 GASTROINTESTINAL STROMAL TUMOR (GIST) (HCC): Primary | ICD-10-CM

## 2022-09-27 DIAGNOSIS — C49.A0 GASTROINTESTINAL STROMAL TUMOR (GIST) (HCC): ICD-10-CM

## 2022-09-27 DIAGNOSIS — M79.89 LEFT LEG SWELLING: ICD-10-CM

## 2022-09-27 DIAGNOSIS — C78.6 PERITONEAL CARCINOMATOSIS (HCC): ICD-10-CM

## 2022-09-27 LAB
ALBUMIN SERPL-MCNC: 2.7 G/DL (ref 3.2–4.6)
ALBUMIN/GLOB SERPL: 0.6 {RATIO} (ref 1.2–3.5)
ALP SERPL-CCNC: 66 U/L (ref 50–136)
ALT SERPL-CCNC: 13 U/L (ref 12–65)
ANION GAP SERPL CALC-SCNC: 8 MMOL/L (ref 4–13)
AST SERPL-CCNC: 17 U/L (ref 15–37)
BASOPHILS # BLD: 0.1 K/UL (ref 0–0.2)
BASOPHILS NFR BLD: 1 % (ref 0–2)
BILIRUB SERPL-MCNC: 0.2 MG/DL (ref 0.2–1.1)
BUN SERPL-MCNC: 16 MG/DL (ref 8–23)
CALCIUM SERPL-MCNC: 9.1 MG/DL (ref 8.3–10.4)
CHLORIDE SERPL-SCNC: 105 MMOL/L (ref 101–110)
CO2 SERPL-SCNC: 26 MMOL/L (ref 21–32)
CREAT SERPL-MCNC: 0.8 MG/DL (ref 0.6–1)
DIFFERENTIAL METHOD BLD: ABNORMAL
EOSINOPHIL # BLD: 0.1 K/UL (ref 0–0.8)
EOSINOPHIL NFR BLD: 1 % (ref 0.5–7.8)
ERYTHROCYTE [DISTWIDTH] IN BLOOD BY AUTOMATED COUNT: 17.2 % (ref 11.9–14.6)
GLOBULIN SER CALC-MCNC: 4.4 G/DL (ref 2.3–3.5)
GLUCOSE SERPL-MCNC: 115 MG/DL (ref 65–100)
HCT VFR BLD AUTO: 27.2 % (ref 35.8–46.3)
HGB BLD-MCNC: 8.4 G/DL (ref 11.7–15.4)
IMM GRANULOCYTES # BLD AUTO: 0 K/UL (ref 0–0.5)
IMM GRANULOCYTES NFR BLD AUTO: 0 % (ref 0–5)
LYMPHOCYTES # BLD: 1.5 K/UL (ref 0.5–4.6)
LYMPHOCYTES NFR BLD: 19 % (ref 13–44)
MCH RBC QN AUTO: 27.1 PG (ref 26.1–32.9)
MCHC RBC AUTO-ENTMCNC: 30.9 G/DL (ref 31.4–35)
MCV RBC AUTO: 87.7 FL (ref 79.6–97.8)
MONOCYTES # BLD: 0.6 K/UL (ref 0.1–1.3)
MONOCYTES NFR BLD: 7 % (ref 4–12)
NEUTS SEG # BLD: 5.7 K/UL (ref 1.7–8.2)
NEUTS SEG NFR BLD: 72 % (ref 43–78)
NRBC # BLD: 0 K/UL (ref 0–0.2)
PLATELET # BLD AUTO: 615 K/UL (ref 150–450)
PMV BLD AUTO: 8.4 FL (ref 9.4–12.3)
POTASSIUM SERPL-SCNC: 4.1 MMOL/L (ref 3.5–5.1)
PROT SERPL-MCNC: 7.1 G/DL (ref 6.3–8.2)
RBC # BLD AUTO: 3.1 M/UL (ref 4.05–5.2)
SODIUM SERPL-SCNC: 139 MMOL/L (ref 136–145)
WBC # BLD AUTO: 7.9 K/UL (ref 4.3–11.1)

## 2022-09-27 PROCEDURE — G8400 PT W/DXA NO RESULTS DOC: HCPCS | Performed by: INTERNAL MEDICINE

## 2022-09-27 PROCEDURE — 1123F ACP DISCUSS/DSCN MKR DOCD: CPT | Performed by: INTERNAL MEDICINE

## 2022-09-27 PROCEDURE — 3017F COLORECTAL CA SCREEN DOC REV: CPT | Performed by: INTERNAL MEDICINE

## 2022-09-27 PROCEDURE — 99215 OFFICE O/P EST HI 40 MIN: CPT | Performed by: INTERNAL MEDICINE

## 2022-09-27 PROCEDURE — 1036F TOBACCO NON-USER: CPT | Performed by: INTERNAL MEDICINE

## 2022-09-27 PROCEDURE — 1090F PRES/ABSN URINE INCON ASSESS: CPT | Performed by: INTERNAL MEDICINE

## 2022-09-27 PROCEDURE — G8427 DOCREV CUR MEDS BY ELIG CLIN: HCPCS | Performed by: INTERNAL MEDICINE

## 2022-09-27 PROCEDURE — 85025 COMPLETE CBC W/AUTO DIFF WBC: CPT

## 2022-09-27 PROCEDURE — 80053 COMPREHEN METABOLIC PANEL: CPT

## 2022-09-27 PROCEDURE — G8420 CALC BMI NORM PARAMETERS: HCPCS | Performed by: INTERNAL MEDICINE

## 2022-09-27 PROCEDURE — 36415 COLL VENOUS BLD VENIPUNCTURE: CPT

## 2022-09-27 ASSESSMENT — PATIENT HEALTH QUESTIONNAIRE - PHQ9
SUM OF ALL RESPONSES TO PHQ QUESTIONS 1-9: 1
2. FEELING DOWN, DEPRESSED OR HOPELESS: 1
1. LITTLE INTEREST OR PLEASURE IN DOING THINGS: 0
SUM OF ALL RESPONSES TO PHQ QUESTIONS 1-9: 1
SUM OF ALL RESPONSES TO PHQ9 QUESTIONS 1 & 2: 1
SUM OF ALL RESPONSES TO PHQ QUESTIONS 1-9: 1
SUM OF ALL RESPONSES TO PHQ QUESTIONS 1-9: 1

## 2022-09-27 NOTE — PATIENT INSTRUCTIONS
Patient Instructions from Today's Visit    Reason for Visit:  Follow-up visit for GIST    Diagnosis Information:  [email or web address] net/about-us/asco-answers-patient-education-materials/bvdu-uwwyoqu-sgyz-sheets    Plan:  -You have been off the Stivarga for several weeks now. -CT scan reviewed: liver looks stable but there are several cystic tumors causing a lot of swelling and pressure. We could send a referral to a surgeon to evaluate for surgery if you want.   -Proceed with hospice appointment this week.      Follow Up:  -As needed    Recent Lab Results:  Hospital Outpatient Visit on 09/27/2022   Component Date Value Ref Range Status    WBC 09/27/2022 7.9  4.3 - 11.1 K/uL Final    RBC 09/27/2022 3.10 (A)  4.05 - 5.2 M/uL Final    Hemoglobin 09/27/2022 8.4 (A)  11.7 - 15.4 g/dL Final    Hematocrit 09/27/2022 27.2 (A)  35.8 - 46.3 % Final    MCV 09/27/2022 87.7  79.6 - 97.8 FL Final    MCH 09/27/2022 27.1  26.1 - 32.9 PG Final    MCHC 09/27/2022 30.9 (A)  31.4 - 35.0 g/dL Final    RDW 09/27/2022 17.2 (A)  11.9 - 14.6 % Final    Platelets 82/34/7974 615 (A)  150 - 450 K/uL Final    MPV 09/27/2022 8.4 (A)  9.4 - 12.3 FL Final    nRBC 09/27/2022 0.00  0.0 - 0.2 K/uL Final    **Note: Absolute NRBC parameter is now reported with Hemogram**    Seg Neutrophils 09/27/2022 72  43 - 78 % Final    Lymphocytes 09/27/2022 19  13 - 44 % Final    Monocytes 09/27/2022 7  4.0 - 12.0 % Final    Eosinophils % 09/27/2022 1  0.5 - 7.8 % Final    Basophils 09/27/2022 1  0.0 - 2.0 % Final    Immature Granulocytes 09/27/2022 0  0.0 - 5.0 % Final    Segs Absolute 09/27/2022 5.7  1.7 - 8.2 K/UL Final    Absolute Lymph # 09/27/2022 1.5  0.5 - 4.6 K/UL Final    Absolute Mono # 09/27/2022 0.6  0.1 - 1.3 K/UL Final    Absolute Eos # 09/27/2022 0.1  0.0 - 0.8 K/UL Final    Basophils Absolute 09/27/2022 0.1  0.0 - 0.2 K/UL Final    Absolute Immature Granulocyte 09/27/2022 0.0  0.0 - 0.5 K/UL Final    Differential Type 09/27/2022 AUTOMATED Final    Sodium 09/27/2022 139  136 - 145 mmol/L Final    Potassium 09/27/2022 4.1  3.5 - 5.1 mmol/L Final    Chloride 09/27/2022 105  101 - 110 mmol/L Final    CO2 09/27/2022 26  21 - 32 mmol/L Final    Anion Gap 09/27/2022 8  4 - 13 mmol/L Final    Glucose 09/27/2022 115 (A)  65 - 100 mg/dL Final    BUN 09/27/2022 16  8 - 23 MG/DL Final    Creatinine 09/27/2022 0.80  0.6 - 1.0 MG/DL Final    GFR  09/27/2022 >60  >60 ml/min/1.73m2 Final    GFR Non- 09/27/2022 >60  >60 ml/min/1.73m2 Final    Comment:      Estimated GFR is calculated using the Modification of Diet in Renal Disease (MDRD) Study equation, reported for both  Americans (GFRAA) and non- Americans (GFRNA), and normalized to 1.73m2 body surface area. The physician must decide which value applies to the patient. The MDRD study equation should only be used in individuals age 25 or older. It has not been validated for the following: pregnant women, patients with serious comorbid conditions,or on certain medications, or persons with extremes of body size, muscle mass, or nutritional status.       Calcium 09/27/2022 9.1  8.3 - 10.4 MG/DL Final    Total Bilirubin 09/27/2022 0.2  0.2 - 1.1 MG/DL Final    ALT 09/27/2022 13  12 - 65 U/L Final    AST 09/27/2022 17  15 - 37 U/L Final    Alk Phosphatase 09/27/2022 66  50 - 136 U/L Final    Total Protein 09/27/2022 7.1  6.3 - 8.2 g/dL Final    Albumin 09/27/2022 2.7 (A)  3.2 - 4.6 g/dL Final    Globulin 09/27/2022 4.4 (A)  2.3 - 3.5 g/dL Final    Albumin/Globulin Ratio 09/27/2022 0.6 (A)  1.2 - 3.5   Final       Treatment Summary has been discussed and given to patient: N/A      -------------------------------------------------------------------------------------------------------------------  Please call our office at [phone] if you have any  of the following symptoms:   Fever of 100.5 or greater  Chills  Shortness of breath  Swelling or pain in one leg    After office hours an answering service is available and will contact a provider for emergencies or if you are experiencing any of the above symptoms. Patient did not express an interest in My Chart. My Chart log in information explained on the after visit summary printout at the [facility] desk.     Anna Villalobos RN

## 2022-09-27 NOTE — PROGRESS NOTES
[facility] Hematology & Oncology: Office Visit Progress Note    Chief Complaint:     GIST      History of Present Illness:   Reason for Referral: Gastrointestinal stromal  tumor (GIST); Secondary malignant neoplasm of liver and intrahepatic bile duct; Iron deficiency anemia due to chronic blood loss       Referring Provider: Arnoldo Mart NP       Primary Care Provider: Arnoldo Mart NP       Family History of Cancer/Hematologic Disorders: Family history is significant for lung cancer, throat/neck cancer, and acute leukemia. Presenting Symptoms: Fatigue, constipation, nausea, and abdominal distension       Ms. Arabella Joiner is a 80 y.o. white female with a history of anemia with history of blood transfusion, HTN, fibroid removal, and myomectomy. Patient  was admitted at [address] in March of 2020 for severe anemia and GI bleed. Work-up included CT scan of the abdomen and pelvis on 3/16/2020 which identified a large soft tissue mass in the posterior margin of the greater curvature measuring  17.9 cm with hypodensity in the left hepatic lobe reflective of metastatic lesion and soft tissue lesion adjacent to the right posterior hepatic lobe suggestive of metastatic implant. On 3/18/20 she underwent upper GI endoscopy with biopsy of the gastric  body mass with pathology revealing a gastrointestinal stromal tumor. Immunohistochemistry was strongly positive for  confirming GIST. Flow cytometric analysis showed mild granulocytic left shift and mild monocytosis; and elevated T cell CD4/CD8 ratio,  with no definitive immunophenotypic evidence of lymphoproliferative disease. During hospitalization, CEA was 0.8 and HGB dropped to 6.0. She received several units of PRBCs. She also received IV iron infusions, as oral iron had been ineffective. She was  planned for a colonoscopy; however, she declined the procedure.         Upon discharge, she was seen outpatient by Oncologist, Dr. Callie Medina, at [facility] She started treatment for GIST with Imatinib on 5/24/2020. CT of the abdomen and pelvis on 9/21/20 when compared to CT of 3/16/20 showed  a significant initial response to treatment with the LUQ tumor measuring 15.5 x 12.6 x 9.9 cm in March of 2020 and measuring 10.3 x 9.9. 3.5 cm in September of 2020. CT of the abdomen and pelvis on 2/17/21 and 8/27/21 showed a stable appearance of gastric  neoplasm with no progression of disease. Patient continued to tolerate Imatinib well. On 11/8/21, CT A/P imaging showed signs of disease progression. In view of progression, treatment was changed to Sunitinib with does reduction to 37.5 mg daily, 4 weeks on, 2 weeks off. ECHO on 12/9/21 showed an EF of 60% and patient was subsequently  started on Sutent. Unfortunately, she experienced severe body aches, peeling skin and felt miserable, and Sutent was discontinued. Patient was given information about Stivarga, and she initially declined to start this. However, on 2/21/22, CT of the  abdomen and pelvis showed multiple large partially cystic masses throughout the abdomen and pelvis which had significantly progressed compared to imaging on 11/8/21. Patient was started on Stivarga 40 mg on 3/10/22. Labs drawn on 2/23/22 showed mild worsening of anemia. Patient reported intermittent bloody stools, but she declined GI referral. Iron panel showed iron deficiency and IV Infed x 4 doses was ordered. Patient received the first dose on 3/30/22. No documentation  was sent to indicate that she received the remaining 3 doses. Most recent labs drawn on 3/31/22 were significant for HGB 11.4, HCT 34.5, RDW 18.6, , and monocytes 10.1. Patient was most recently seen by Dr. Jorge Alberto Guerin on 3/30/22.  Patient has since relocated from [place] to the [facility], and now presents  to [facility], per referral from primary care, to establish oncology care for continued management of gastrointestinal stromal tumor, secondary malignant neoplasm of liver and intrahepatic  bile duct, and iron deficiency anemia due to chronic blood loss. She continues on Stivarga 40 mg.        SURGICAL PATHOLOGY 3/18/2020                 FLOW CYTOMETRY ANALYSIS 3/18/2020            CT ABDOMEN AND PELVIS W CONTRAST 11/8/21                               CT CHEST WITH CONTRAST 11/8/21                 CT OF THE ABDOMEN AND PELVIS WITHOUT IV CONTRAST 2/21/22                      CMP 1/6/22            CBC 3/9/22                 CBC 3/31/22                CT 6/17/22:        Interim history update in A/P. Review of Systems:      Constitutional  Denies fever or chills. Fatigue, weight loss, anorexia        HEENT  Denies trauma, hearing loss, ear pain, nosebleeds, sore throat, neck pain and ear discharge. Change in vision         Skin  Rash, itching, blister     Lungs  Denies shortness of breath, cough, sputum production or hemoptysis. Cardiovascular  Denies palpitations, orthopnea, claudication and leg swelling. Chest pain/pressure     Gastrointestinal  Denies nausea, vomiting. Denies abdominal pain. Constipation, dark black/bloody stools, distention       Denies dysuria, or hesitancy of urination   Frequency      Neuro  Denies ataxia. Denies dizziness, tremors, speech change, focal weakness. Neuropathy, headaches     Hematology  Denies nasal/gum bleeding, denies easy bruise     Endo  Denies heat/cold intolerance, denies diabetes. MSK  Denies back pain, swollen legs, and falls. Joint pain     Psychiatric/Behavioral  Denies depression and substance abuse. The patient is not nervous/anxious.               No Known Allergies  Past Medical History:   Diagnosis Date    Anemia     GIST (gastrointestinal stromal tumor), malignant ([address]     Hypertension     Stomach cancer ([facility]     GIST     Past Surgical History:   Procedure Laterality Date    MYOMECTOMY      OTHER SURGICAL HISTORY      removal of fibroid    UPPER GASTROINTESTINAL ENDOSCOPY      UPPER GASTROINTESTINAL ENDOSCOPY       Family History   Problem Relation Age of Onset    Lung Cancer Mother     Diabetes Mother     No Known Problems Brother      Social History     Socioeconomic History    Marital status: Single     Spouse name: Not on file    Number of children: Not on file    Years of education: Not on file    Highest education level: Not on file   Occupational History    Not on file   Tobacco Use    Smoking status: Never    Smokeless tobacco: Never   Substance and Sexual Activity    Alcohol use: Not Currently    Drug use: Not Currently    Sexual activity: Not on file   Other Topics Concern    Not on file   Social History Narrative    Not on file     Social Determinants of Health     Financial Resource Strain: Not on file   Food Insecurity: Not on file   Transportation Needs: Not on file   Physical Activity: Not on file   Stress: Not on file   Social Connections: Not on file   Intimate Partner Violence: Not on file   Housing Stability: Not on file     Current Outpatient Medications   Medication Sig Dispense Refill    Cholecalciferol (VITAMIN D3) 1.25 MG (75829 UT) CAPS Take 1 capsule by mouth daily      Ascorbic Acid (VITAMIN C) 1000 MG tablet Take 1,000 mg by mouth in the morning. STIVARGA 40 MG tablet Take 3 tablets by mouth before bedtime.  21 day cycle-7 days off . 63 tablet 3    acetaminophen (TYLENOL) 500 MG tablet Take 500 mg by mouth every 4 hours as needed for Pain      losartan (COZAAR) 100 MG tablet Take 1 tablet by mouth daily 30 tablet 5    metoprolol succinate (TOPROL XL) 25 MG extended release tablet Take 1 tablet by mouth daily 30 tablet 5    Ferrous Sulfate (IRON) 90 (18 Fe) MG TABS Take 18 mg by mouth daily      b complex vitamins capsule Take 1 capsule by mouth daily With B1       Magic Mouthwash (MIRACLE MOUTHWASH) Swish and spit 5 mLs 4 times daily as needed for Irritation (Patient not taking: Reported on 9/27/2022) 480 mL 1     No current facility-administered medications for this visit. OBJECTIVE:  /73 (Site: Right Upper Arm, Position: Sitting, Cuff Size: Medium Adult)   Pulse 96   Temp 98.7 °F (37.1 °C) (Oral)   Resp 14   Ht 5' 2\" (1.575 m)   Wt 131 lb 3.2 oz (59.5 kg)   SpO2 98%   BMI 24.00 kg/m²     Physical Exam:  Constitutional: Oriented to person, place, and time. Thin. Well-nourished. HEENT: Normocephalic and atraumatic. Oropharynx is clear and moist.   Conjunctivae and EOM are normal. Pupils are equal, round, and reactive to light. No scleral icterus. Neck supple. No JVD present. No tracheal deviation present. No thyromegaly present. Lymph node No palpable submandibular, cervical, supraclavicular, axillary and inguinal lymph nodes. Skin  calluses and blisters on bottom of both feet. Warm and dry. No bruising and no rash noted. No erythema. No pallor. Respiratory Effort normal and breath sounds normal.  No respiratory distress. No wheezes. No rales. No tenderness. CVS Normal rate, regular rhythm and normal heart sounds. Exam reveals no gallop, no friction and no rub. No murmur heard. Abdomen Distended firmly. Bowel sounds are normal.There is no tenderness. There is no rebound and no guarding. Neuro Normal reflexes. No cranial nerve deficit. Exhibits normal muscle tone, 5 of 5 strength of all extremities. MSK Normal range of motion in general.  No edema and no tenderness.    Psych Normal mood, affect, behavior, judgment and thought content      Labs:  Recent Results (from the past 24 hour(s))   CBC with Auto Differential    Collection Time: 09/27/22  1:55 PM   Result Value Ref Range    WBC 7.9 4.3 - 11.1 K/uL    RBC 3.10 (L) 4.05 - 5.2 M/uL    Hemoglobin 8.4 (L) 11.7 - 15.4 g/dL    Hematocrit 27.2 (L) 35.8 - 46.3 %    MCV 87.7 79.6 - 97.8 FL    MCH 27.1 26.1 - 32.9 PG    MCHC 30.9 (L) 31.4 - 35.0 g/dL    RDW 17.2 (H) 11.9 - 14.6 %    Platelets 162 (H) 066 - 450 K/uL    MPV 8.4 (L) 9.4 - 12.3 FL    nRBC 0.00 0.0 - 0.2 K/uL    Seg Neutrophils 72 43 - 78 %    Lymphocytes 19 13 - 44 %    Monocytes 7 4.0 - 12.0 %    Eosinophils % 1 0.5 - 7.8 %    Basophils 1 0.0 - 2.0 %    Immature Granulocytes 0 0.0 - 5.0 %    Segs Absolute 5.7 1.7 - 8.2 K/UL    Absolute Lymph # 1.5 0.5 - 4.6 K/UL    Absolute Mono # 0.6 0.1 - 1.3 K/UL    Absolute Eos # 0.1 0.0 - 0.8 K/UL    Basophils Absolute 0.1 0.0 - 0.2 K/UL    Absolute Immature Granulocyte 0.0 0.0 - 0.5 K/UL    Differential Type AUTOMATED     Comprehensive Metabolic Panel    Collection Time: 09/27/22  1:55 PM   Result Value Ref Range    Sodium 139 136 - 145 mmol/L    Potassium 4.1 3.5 - 5.1 mmol/L    Chloride 105 101 - 110 mmol/L    CO2 26 21 - 32 mmol/L    Anion Gap 8 4 - 13 mmol/L    Glucose 115 (H) 65 - 100 mg/dL    BUN 16 8 - 23 MG/DL    Creatinine 0.80 0.6 - 1.0 MG/DL    GFR African American >60 >60 ml/min/1.73m2    GFR Non- >60 >60 ml/min/1.73m2    Calcium 9.1 8.3 - 10.4 MG/DL    Total Bilirubin 0.2 0.2 - 1.1 MG/DL    ALT 13 12 - 65 U/L    AST 17 15 - 37 U/L    Alk Phosphatase 66 50 - 136 U/L    Total Protein 7.1 6.3 - 8.2 g/dL    Albumin 2.7 (L) 3.2 - 4.6 g/dL    Globulin 4.4 (H) 2.3 - 3.5 g/dL    Albumin/Globulin Ratio 0.6 (L) 1.2 - 3.5         Imaging:  No results found for this or any previous visit. ASSESSMENT/PLAN:   Diagnosis Orders   1. Gastrointestinal stromal tumor (GIST) ([facility]        2. Left leg swelling        3. Liver metastasis ([facility]        4. Peritoneal carcinomatosis ([facility]        5. High risk medication use                80 y.o. F consulted for metastatic GIST presented to [facility] with sister on 5/3/2022.   She was diagnosed of large stomach GIST  and the liver metastasis in 3/2020, started [address] in 5/2020 with good response until 11/2021, did not try to increase dose of imatinib and changed to sunitinib which was poorly tolerated and stopped quickly, follow-up imaging showed disease progression  and started regorafenib from 3/2022, relocated to [place] to join sister to help with her care given she struggles to manage her medication and visits due to the Alzheimer's. We discussed her current dose of Regorafenib was dose reduced but will  continue current 120 mg dose and repeat CT in a month, will obtain image from [place] to compare, may consider rechallenge with higher dose of imatinib 800 mg daily given the good tolerance and efficacy previously, may repeat biopsy for mutation screening,  she also had complaining of headache but declined brain MRI. Return next month but call as needed.     She rescheduled that the CT and did not return before starting cycle 4, called to working on 6/6/2022 complaining of pain and blister on bottom of feet, which did not seem typical spread of HFS but there is calluses with blisters and pain, which was consistent to her poor foot care when she forgot to bring her shoes from [place] till her brother sent them over, prescribed urea lotion and discussed saturate with lotion and wear socks and comfortable shoes, would not change dose for Stivarga yet and returned 2 weeks later showed much improved on both feet after good foot care was done, continue use of urea lotion, repeat CT 6/17/2022 showed stable disease, discussed with patient/sister/brother to continue current treatment, discussed the need better hypertension control and add losartan, discussed compliance improvement and will try new pillboxes, discussed weight loss and proper nutrition, followed on 8/23/2022, very talkative and repeating many questions, she still reports low energy/fatigue fluctuating a lot, feet care was better but they are not agreeable on what she did herself and pending appointment with podiatrist, again discussed her various complaints need to be fine analyzed whether related to regorafenib, and whether to make changes depending on the benefit versus toxicities, return on 9/27/2022 and we discussed repeat CT showed stable disease for the measurable solid tumors but the major cystic lesion in abdomen appears larger, I showed the CT pictures to patient and sister and explained that further systemic therapy is not likely to provide her symptom relief given the large cystic lesions, and she has thought about her situation and feels like to have the nature take the course and had already scheduled to meet with [address], also discussed the more aggressive approach with surgical debulking of the peritoneal disease/cysts, and the time being patient will like to meet with hospice and call if changing her mind or desires to meet with surgeon to discuss, also discussed CODE STATUS and she had previously desired to be DNR and feels same for now but will call if she changes her mind. We will be available as needed. All questions are answered to their satisfaction. They will call for further questions and concerns. ECOG PERFORMANCE STATUS - 1- Restricted in physically strenuous activity but ambulatory and able to carry out work of a light or sedentary nature such as light house work, office work. Pain - 4/10. Mild to moderate pain, requiring medication - see MAR     Fatigue - No flowsheet data found. Distress - No flowsheet data found. Elements of this note have been dictated via voice recognition software. Text and phrases may be limited by the accuracy and autoconversion of the software. The chart has been reviewed, but errors may still be present. Wendy Downing M.D.   [address]  Office : [phone]  Fax : [phone]

## 2022-10-03 ENCOUNTER — NURSE TRIAGE (OUTPATIENT)
Dept: OTHER | Facility: CLINIC | Age: 72
End: 2022-10-03

## 2022-10-03 ENCOUNTER — TELEPHONE (OUTPATIENT)
Dept: INTERNAL MEDICINE CLINIC | Facility: CLINIC | Age: 72
End: 2022-10-03

## 2022-10-03 ENCOUNTER — OFFICE VISIT (OUTPATIENT)
Dept: INTERNAL MEDICINE CLINIC | Facility: CLINIC | Age: 72
End: 2022-10-03
Payer: MEDICARE

## 2022-10-03 VITALS
OXYGEN SATURATION: 97 % | BODY MASS INDEX: 24.29 KG/M2 | SYSTOLIC BLOOD PRESSURE: 120 MMHG | DIASTOLIC BLOOD PRESSURE: 50 MMHG | WEIGHT: 132 LBS | HEIGHT: 62 IN | HEART RATE: 111 BPM

## 2022-10-03 DIAGNOSIS — D64.9 ANEMIA, UNSPECIFIED TYPE: Primary | ICD-10-CM

## 2022-10-03 DIAGNOSIS — D50.0 IRON DEFICIENCY ANEMIA DUE TO CHRONIC BLOOD LOSS: ICD-10-CM

## 2022-10-03 DIAGNOSIS — C78.7 LIVER METASTASIS (HCC): ICD-10-CM

## 2022-10-03 DIAGNOSIS — M79.89 PAIN AND SWELLING OF LEFT LOWER LEG: ICD-10-CM

## 2022-10-03 DIAGNOSIS — C49.A0 GASTROINTESTINAL STROMAL TUMOR (GIST) (HCC): ICD-10-CM

## 2022-10-03 DIAGNOSIS — M79.662 PAIN AND SWELLING OF LEFT LOWER LEG: ICD-10-CM

## 2022-10-03 PROCEDURE — 1036F TOBACCO NON-USER: CPT | Performed by: NURSE PRACTITIONER

## 2022-10-03 PROCEDURE — G8484 FLU IMMUNIZE NO ADMIN: HCPCS | Performed by: NURSE PRACTITIONER

## 2022-10-03 PROCEDURE — 1090F PRES/ABSN URINE INCON ASSESS: CPT | Performed by: NURSE PRACTITIONER

## 2022-10-03 PROCEDURE — G8427 DOCREV CUR MEDS BY ELIG CLIN: HCPCS | Performed by: NURSE PRACTITIONER

## 2022-10-03 PROCEDURE — 1123F ACP DISCUSS/DSCN MKR DOCD: CPT | Performed by: NURSE PRACTITIONER

## 2022-10-03 PROCEDURE — 3017F COLORECTAL CA SCREEN DOC REV: CPT | Performed by: NURSE PRACTITIONER

## 2022-10-03 PROCEDURE — G8420 CALC BMI NORM PARAMETERS: HCPCS | Performed by: NURSE PRACTITIONER

## 2022-10-03 PROCEDURE — G8400 PT W/DXA NO RESULTS DOC: HCPCS | Performed by: NURSE PRACTITIONER

## 2022-10-03 PROCEDURE — 99213 OFFICE O/P EST LOW 20 MIN: CPT | Performed by: NURSE PRACTITIONER

## 2022-10-03 ASSESSMENT — PATIENT HEALTH QUESTIONNAIRE - PHQ9
SUM OF ALL RESPONSES TO PHQ QUESTIONS 1-9: 0
SUM OF ALL RESPONSES TO PHQ QUESTIONS 1-9: 0
SUM OF ALL RESPONSES TO PHQ9 QUESTIONS 1 & 2: 0
2. FEELING DOWN, DEPRESSED OR HOPELESS: 0
1. LITTLE INTEREST OR PLEASURE IN DOING THINGS: 0
SUM OF ALL RESPONSES TO PHQ QUESTIONS 1-9: 0
SUM OF ALL RESPONSES TO PHQ QUESTIONS 1-9: 0

## 2022-10-03 ASSESSMENT — ENCOUNTER SYMPTOMS
VOMITING: 0
BOWEL INCONTINENCE: 0
NAUSEA: 0
ABDOMINAL PAIN: 1
COUGH: 0

## 2022-10-03 NOTE — PROGRESS NOTES
Deborah Ross (:  1950) is a 80 y.o. female,Established patient, here for evaluation of the following chief complaint(s):  Joint Swelling (Swelling in ankles one is worse than the other /) and Fall (On knee/)         ASSESSMENT/PLAN:  1. Anemia, unspecified type  -     CBC; Future  -     External Referral to Hospice  2. Pain and swelling of left lower leg  -     CBC; Future  -     Vascular duplex lower extremity venous left; Future  -     External Referral to Hospice  3. Gastrointestinal stromal tumor (GIST) ([facility]  -     External Referral to Hospice  4. Iron deficiency anemia due to chronic blood loss  -     External Referral to Hospice  5. Liver metastasis ([facility] [address]  -     External Referral to Hospice    No follow-ups on file. Patient with pain and swelling left leg, less pain now but has been painful  R/o dvt with duplex  Anemia on lab, recheck to ensure hasn't dropped   Discussed further care, she would like referral to [address] [place] [address]  Patient and sister in agreement that aggressive treatment isn't desired  However they would like to know if she needs a transfusion or has a dvt    Subjective   SUBJECTIVE/OBJECTIVE:  Patient is here for leg swelling after a fall. She fell a few weeks ago when getting out of bed on her right knee. She has bilateral ankle swelling. Her left leg is more swollen. Her ankles swelled up after the fall as well. The left is worse. She quit taking stivarga     Fall  The accident occurred More than 1 week ago. The fall occurred from a bed. She landed on [facility]. The point of impact was the right knee. The patient is experiencing no pain. Associated symptoms include abdominal pain. Pertinent negatives include no bowel incontinence, fever, headaches, hematuria, nausea or vomiting. Edema  This is a new problem. The current episode started more than 1 year ago. The problem has been gradually worsening. Associated symptoms include abdominal pain.  Pertinent negatives include no anorexia, chest pain, chills, congestion, coughing, diaphoresis, fever, headaches, nausea or vomiting. Review of Systems   Constitutional:  Negative for chills, diaphoresis and fever. HENT:  Negative for congestion. Respiratory:  Negative for cough. Cardiovascular:  Negative for chest pain. Gastrointestinal:  Positive for abdominal pain. Negative for anorexia, bowel incontinence, nausea and vomiting. Genitourinary:  Negative for hematuria. Neurological:  Negative for headaches. Objective   Physical Exam  Vitals reviewed. Constitutional:       Appearance: Normal appearance. She is ill-appearing. HENT:      Head: Normocephalic. Right Ear: External ear normal.      Left Ear: External ear normal.   Eyes:      Extraocular Movements: Extraocular movements intact. Pupils: Pupils are equal, round, and reactive to light. Cardiovascular:      Rate and Rhythm: Normal rate and regular rhythm. Pulmonary:      Effort: Pulmonary effort is normal.      Breath sounds: Normal breath sounds. Abdominal:      General: There is distension (firmly distended). Tenderness: There is abdominal tenderness. Musculoskeletal:         General: Swelling and tenderness present. Cervical back: Neck supple. Right lower leg: Edema present. Left lower leg: Edema present. Comments: Edema pitting 2+ up bilateral legs, left larger than right  Mild erythema left ankle  Nontender at this time   Skin:     General: Skin is warm and dry. Neurological:      General: No focal deficit present. Mental Status: She is alert and oriented to person, place, and time. Psychiatric:         Mood and Affect: Mood normal.         Behavior: Behavior normal.                An electronic signature was used to authenticate this note.     --Bernie Benito, APRN - CNP

## 2022-10-03 NOTE — TELEPHONE ENCOUNTER
Received call from Florian Yanes at [employer] with [employer]. Subjective: Caller states \"I held onto a doorknob to get out of a bed and I went splat on my right knee and it started this thing. The edema started from that. I didn't get x-rays or anything. Now both sides are swelling. \"     Current Symptoms: originally right knee swelling from fall. No bruising. The right knee improved but not resolved. Ankles started to swell a few days afterwards. Does not extend up into calf. Dr. Yadira Reed noticed it 9/27 and concerned for DVT. Left larger than the left. Pain with palpation. \"Puffy on the top\"    Onset: 2 weeks ago; fall    Associated Symptoms: inability to wear slippers due to edema. No SOB noted, pt speaking in complete sentences. Equal coloration of feet, not red. Pain Severity: pt unable to rate when tried and asked to apply pain scale    Temperature: denies     What has been tried: attempt to elevate \"but it doesn't feel right\" pt states she does not want to wear compression stockings. LMP: NA Pregnant: NA    Recommended disposition: Go to ED/UCC Now (Or to Office with PCP Approval)    Care advice provided, patient verbalizes understanding; denies any other questions or concerns; instructed to call back for any new or worsening symptoms. Writer provided warm transfer to [facility] [place] [address]  for 2nd level triage regarding ankle edema, one side larger than the other. Attention Provider: Thank you for allowing me to participate in the care of your patient. The patient was connected to triage in response to information provided to the [facility]/PSC. Please do not respond through this encounter as the response is not directed to a shared pool.     Reason for Disposition   Thigh, calf, or ankle swelling in both legs, but one side is definitely more swollen (Exception: longstanding difference between legs)    Protocols used: Leg Swelling and Edema-ADULT-[place]

## 2022-10-03 NOTE — TELEPHONE ENCOUNTER
Sister in office with patient gave me information for hospice they want to use. They would like to use [facility] phone number: [phone] Fax Number: [phone] . They requested we send medical records (H&P).

## 2022-10-04 ENCOUNTER — HOSPITAL ENCOUNTER (OUTPATIENT)
Dept: ULTRASOUND IMAGING | Age: 72
Discharge: HOME | End: 2022-10-07
Payer: MEDICARE

## 2022-10-04 DIAGNOSIS — D64.9 ANEMIA, UNSPECIFIED TYPE: ICD-10-CM

## 2022-10-04 DIAGNOSIS — I10 PRIMARY HYPERTENSION: ICD-10-CM

## 2022-10-04 DIAGNOSIS — L65.9 HAIR LOSS: ICD-10-CM

## 2022-10-04 DIAGNOSIS — R41.3 MEMORY LOSS: ICD-10-CM

## 2022-10-04 DIAGNOSIS — M79.662 PAIN AND SWELLING OF LEFT LOWER LEG: ICD-10-CM

## 2022-10-04 DIAGNOSIS — R73.09 ELEVATED GLUCOSE: ICD-10-CM

## 2022-10-04 DIAGNOSIS — M79.89 PAIN AND SWELLING OF LEFT LOWER LEG: ICD-10-CM

## 2022-10-04 LAB
ALBUMIN SERPL-MCNC: 2.8 G/DL (ref 3.2–4.6)
ALBUMIN/GLOB SERPL: 0.8 {RATIO} (ref 1.2–3.5)
ALP SERPL-CCNC: 74 U/L (ref 50–136)
ALT SERPL-CCNC: 15 U/L (ref 12–65)
ANION GAP SERPL CALC-SCNC: 8 MMOL/L (ref 4–13)
AST SERPL-CCNC: 17 U/L (ref 15–37)
BILIRUB SERPL-MCNC: 0.3 MG/DL (ref 0.2–1.1)
BUN SERPL-MCNC: 19 MG/DL (ref 8–23)
CALCIUM SERPL-MCNC: 9.1 MG/DL (ref 8.3–10.4)
CHLORIDE SERPL-SCNC: 105 MMOL/L (ref 101–110)
CHOLEST SERPL-MCNC: 146 MG/DL
CO2 SERPL-SCNC: 27 MMOL/L (ref 21–32)
CREAT SERPL-MCNC: 1 MG/DL (ref 0.6–1)
ERYTHROCYTE [DISTWIDTH] IN BLOOD BY AUTOMATED COUNT: 16.9 % (ref 11.9–14.6)
GLOBULIN SER CALC-MCNC: 3.6 G/DL (ref 2.3–3.5)
GLUCOSE SERPL-MCNC: 121 MG/DL (ref 65–100)
HCT VFR BLD AUTO: 26.4 % (ref 35.8–46.3)
HDLC SERPL-MCNC: 67 MG/DL (ref 40–60)
HDLC SERPL: 2.2 {RATIO}
HGB BLD-MCNC: 7.8 G/DL (ref 11.7–15.4)
LDLC SERPL CALC-MCNC: 57.6 MG/DL
MCH RBC QN AUTO: 26.3 PG (ref 26.1–32.9)
MCHC RBC AUTO-ENTMCNC: 29.5 G/DL (ref 31.4–35)
MCV RBC AUTO: 88.9 FL (ref 79.6–97.8)
NRBC # BLD: 0 K/UL (ref 0–0.2)
PLATELET # BLD AUTO: 758 K/UL (ref 150–450)
PMV BLD AUTO: 8.9 FL (ref 9.4–12.3)
POTASSIUM SERPL-SCNC: 4.9 MMOL/L (ref 3.5–5.1)
PROT SERPL-MCNC: 6.4 G/DL (ref 6.3–8.2)
RBC # BLD AUTO: 2.97 M/UL (ref 4.05–5.2)
SODIUM SERPL-SCNC: 140 MMOL/L (ref 136–145)
TRIGL SERPL-MCNC: 107 MG/DL (ref 35–150)
TSH, 3RD GENERATION: 9.53 UIU/ML (ref 0.36–3.74)
VLDLC SERPL CALC-MCNC: 21.4 MG/DL (ref 6–23)
WBC # BLD AUTO: 9.3 K/UL (ref 4.3–11.1)

## 2022-10-04 PROCEDURE — 93971 EXTREMITY STUDY: CPT

## 2022-10-05 LAB
EST. AVERAGE GLUCOSE BLD GHB EST-MCNC: 108 MG/DL
HBA1C MFR BLD: 5.4 % (ref 4.8–5.6)